# Patient Record
Sex: FEMALE | Race: OTHER | HISPANIC OR LATINO | Employment: OTHER | URBAN - METROPOLITAN AREA
[De-identification: names, ages, dates, MRNs, and addresses within clinical notes are randomized per-mention and may not be internally consistent; named-entity substitution may affect disease eponyms.]

---

## 2017-01-09 ENCOUNTER — GENERIC CONVERSION - ENCOUNTER (OUTPATIENT)
Dept: OTHER | Facility: OTHER | Age: 27
End: 2017-01-09

## 2017-02-28 ENCOUNTER — ALLSCRIPTS OFFICE VISIT (OUTPATIENT)
Dept: OTHER | Facility: OTHER | Age: 27
End: 2017-02-28

## 2017-02-28 PROCEDURE — 88175 CYTOPATH C/V AUTO FLUID REDO: CPT | Performed by: OBSTETRICS & GYNECOLOGY

## 2017-03-01 ENCOUNTER — LAB REQUISITION (OUTPATIENT)
Dept: LAB | Facility: HOSPITAL | Age: 27
End: 2017-03-01
Payer: COMMERCIAL

## 2017-03-01 DIAGNOSIS — R87.610 ATYPICAL SQUAMOUS CELLS OF UNDETERMINED SIGNIFICANCE ON CYTOLOGIC SMEAR OF CERVIX (ASC-US): ICD-10-CM

## 2017-03-07 LAB
LAB AP GYN PRIMARY INTERPRETATION: NORMAL
Lab: NORMAL
PATH INTERP SPEC-IMP: NORMAL

## 2017-03-15 ENCOUNTER — GENERIC CONVERSION - ENCOUNTER (OUTPATIENT)
Dept: OTHER | Facility: OTHER | Age: 27
End: 2017-03-15

## 2017-03-15 PROCEDURE — 88305 TISSUE EXAM BY PATHOLOGIST: CPT | Performed by: OBSTETRICS & GYNECOLOGY

## 2017-03-16 ENCOUNTER — LAB REQUISITION (OUTPATIENT)
Dept: LAB | Facility: HOSPITAL | Age: 27
End: 2017-03-16
Payer: COMMERCIAL

## 2017-03-16 DIAGNOSIS — R87.610 ATYPICAL SQUAMOUS CELLS OF UNDETERMINED SIGNIFICANCE ON CYTOLOGIC SMEAR OF CERVIX (ASC-US): ICD-10-CM

## 2017-03-29 ENCOUNTER — ALLSCRIPTS OFFICE VISIT (OUTPATIENT)
Dept: OTHER | Facility: OTHER | Age: 27
End: 2017-03-29

## 2017-03-31 ENCOUNTER — ALLSCRIPTS OFFICE VISIT (OUTPATIENT)
Dept: OTHER | Facility: OTHER | Age: 27
End: 2017-03-31

## 2017-04-03 ENCOUNTER — GENERIC CONVERSION - ENCOUNTER (OUTPATIENT)
Dept: OTHER | Facility: OTHER | Age: 27
End: 2017-04-03

## 2017-04-18 ENCOUNTER — ALLSCRIPTS OFFICE VISIT (OUTPATIENT)
Dept: OTHER | Facility: OTHER | Age: 27
End: 2017-04-18

## 2017-06-29 ENCOUNTER — ALLSCRIPTS OFFICE VISIT (OUTPATIENT)
Dept: OTHER | Facility: OTHER | Age: 27
End: 2017-06-29

## 2017-06-29 ENCOUNTER — LAB REQUISITION (OUTPATIENT)
Dept: LAB | Facility: HOSPITAL | Age: 27
End: 2017-06-29
Payer: COMMERCIAL

## 2017-06-29 DIAGNOSIS — R87.610 ATYPICAL SQUAMOUS CELLS OF UNDETERMINED SIGNIFICANCE ON CYTOLOGIC SMEAR OF CERVIX (ASC-US): ICD-10-CM

## 2017-06-29 PROCEDURE — 87624 HPV HI-RISK TYP POOLED RSLT: CPT | Performed by: OBSTETRICS & GYNECOLOGY

## 2017-06-29 PROCEDURE — 88175 CYTOPATH C/V AUTO FLUID REDO: CPT | Performed by: OBSTETRICS & GYNECOLOGY

## 2017-07-05 LAB — HPV RRNA GENITAL QL NAA+PROBE: NORMAL

## 2017-07-09 LAB
LAB AP GYN PRIMARY INTERPRETATION: NORMAL
Lab: NORMAL

## 2017-09-19 ENCOUNTER — GENERIC CONVERSION - ENCOUNTER (OUTPATIENT)
Dept: OTHER | Facility: OTHER | Age: 27
End: 2017-09-19

## 2017-11-30 ENCOUNTER — TRANSCRIBE ORDERS (OUTPATIENT)
Dept: URGENT CARE | Facility: MEDICAL CENTER | Age: 27
End: 2017-11-30

## 2017-11-30 ENCOUNTER — APPOINTMENT (OUTPATIENT)
Dept: LAB | Facility: MEDICAL CENTER | Age: 27
End: 2017-11-30
Attending: FAMILY MEDICINE

## 2017-11-30 ENCOUNTER — APPOINTMENT (OUTPATIENT)
Dept: URGENT CARE | Facility: MEDICAL CENTER | Age: 27
End: 2017-11-30

## 2017-11-30 DIAGNOSIS — Z02.1 PHYSICAL EXAM, PRE-EMPLOYMENT: Primary | ICD-10-CM

## 2017-11-30 DIAGNOSIS — Z02.1 PHYSICAL EXAM, PRE-EMPLOYMENT: ICD-10-CM

## 2017-11-30 PROCEDURE — 86735 MUMPS ANTIBODY: CPT

## 2017-11-30 PROCEDURE — 86787 VARICELLA-ZOSTER ANTIBODY: CPT

## 2017-11-30 PROCEDURE — 86480 TB TEST CELL IMMUN MEASURE: CPT

## 2017-11-30 PROCEDURE — 86765 RUBEOLA ANTIBODY: CPT

## 2017-11-30 PROCEDURE — 36415 COLL VENOUS BLD VENIPUNCTURE: CPT

## 2017-11-30 PROCEDURE — 86762 RUBELLA ANTIBODY: CPT

## 2017-12-01 LAB — RUBV IGG SERPL IA-ACNC: 60.3 IU/ML

## 2017-12-02 LAB
ANNOTATION COMMENT IMP: NORMAL
GAMMA INTERFERON BACKGROUND BLD IA-ACNC: 0.04 IU/ML
M TB IFN-G BLD-IMP: NEGATIVE
M TB IFN-G CD4+ BCKGRND COR BLD-ACNC: 0 IU/ML
M TB IFN-G CD4+ T-CELLS BLD-ACNC: 0.04 IU/ML
MITOGEN IGNF BLD-ACNC: 7.44 IU/ML
QUANTIFERON-TB GOLD IN TUBE: NORMAL
SERVICE CMNT-IMP: NORMAL

## 2017-12-05 LAB
MEV IGG SER QL: NORMAL
MUV IGG SER QL: NORMAL
VZV IGG SER IA-ACNC: NORMAL

## 2017-12-28 DIAGNOSIS — N91.2 AMENORRHEA: ICD-10-CM

## 2018-01-04 ENCOUNTER — APPOINTMENT (OUTPATIENT)
Dept: LAB | Facility: HOSPITAL | Age: 28
End: 2018-01-04
Attending: OBSTETRICS & GYNECOLOGY
Payer: COMMERCIAL

## 2018-01-04 DIAGNOSIS — N91.2 AMENORRHEA: ICD-10-CM

## 2018-01-04 LAB — B-HCG SERPL-ACNC: <2 MIU/ML

## 2018-01-04 PROCEDURE — 84702 CHORIONIC GONADOTROPIN TEST: CPT

## 2018-01-04 PROCEDURE — 36415 COLL VENOUS BLD VENIPUNCTURE: CPT

## 2018-01-10 NOTE — MISCELLANEOUS
Message  I informed pt of vaccine excursion for TDAP that was administered during her pregnancy  I offered pt revaccination due to possible lowered conferred immunity, pt will call the office to make an appointment for revaccination         Signatures   Electronically signed by : Abbott Cabot, M D ; Dec 29 2016 10:00AM EST                       (Author)

## 2018-01-11 NOTE — PROGRESS NOTES
AUG 22 2016         RE: Jaxon Bills                                   To: Aman Arroyo GYN   MR#: [id number]                                    [address]   :  [address]: [phone]:[address]   (Exam #: [id number])                           Fax: [phone]      The LMP of this 30year old,  G2, P1-0-0-1 patient was DEC 23 2015, giving   her an TYSON of SEP 24 2016 and a current gestational age of 27 weeks 2 days   by dates  A sonographic examination was performed on AUG 22 2016 using   real time equipment  The ultrasound examination was performed using   abdominal technique  The patient has a BMI of 34 6  Her blood pressure   today was 110/70  Earliest ultrasound found in her record: 16 9w1d 16 TYSON      Cardiac motion was observed at 150 bpm       INDICATIONS      third trimester bleeding      Exam Types      Amniotic Fluid Index      RESULTS      Fetus # 1 of 1   Vertex presentation   Placenta Location = Anterior   Placenta Grade = II      AMNIOTIC FLUID      Q1: 5 1      Q2: 4 6      Q3: 5 0      Q4: 2 8   AUSTIN Total = 17 5 cm   Amniotic Fluid: Normal      IMPRESSION      Roman IUP   Vertex presentation   Regular fetal heart rate of 150 bpm   Anterior placenta      GENERAL COMMENT      Jaxon Bills has had persistent third trimester bleeding  She presents   today for an ultrasound and nonstress tests  The nonstress test showed   good variability, the presence of accelerations and no decelerations  The   amniotic fluid was 17 cm which is reassuring  The fetus is vertex in presentation with an anterior placenta and thus   today's ultrasound was reassuring  She will continue weekly nonstress   testing and fluid assessments  TONIO Moreno M D     Maternal-Fetal Medicine   Electronically signed 16 14:46

## 2018-01-11 NOTE — PROGRESS NOTES
Active Problems    1  Abnormal placenta affecting management of mother in second trimester (656 73)   (O43 92)   2  Anxiety (300 00) (F41 9)   3  Chronic migraine without aura with status migrainosus, not intractable (346 72)   (G43 701)   4  Diet controlled gestational diabetes mellitus (GDM) in second trimester (648 83)   (O24 410)   5  Hyperthyroidism (242 90) (E05 90)   6  Hypothyroidism (244 9) (E03 9)   7  Hypothyroidism complicating pregnancy, second trimester (648 13,244 9)   (O99 282,E03 9)   8  Insulin controlled gestational diabetes mellitus (GDM) in second trimester (648 83)   (O24 414)   9  Maternal obesity, antepartum, second trimester (649 13,278 00) (O99 212,E66 9)   10  Normal pregnancy, unspecified trimester (V22 1) (Z34 90)   11  Uncomplicated asthma, unspecified asthma severity (493 90) (J45 909)   12  Yeast infection of the vagina (112 1) (B37 3)    Current Meds    1  Accu-Chek FastClix Lancets Miscellaneous; 4 times a day as directed; Therapy: 45Pjg3946 to (Evaluate:02Boq4615)  Requested for: 40Bzk6991; Last   Rx:25Apr2016 Ordered   2  Accu-Chek SmartView In Vitro Strip; TEST 4 TIMES DAILY; Therapy: 59Fhu1166 to (Peter Semen)  Requested for: 13Prz3732; Last   Rx:90Rvl4378 Ordered    3  BD Pen Needle Short U/F 31G X 8 MM Miscellaneous; USE AS DIRECTED  1 per   injection  Takes 6 injections daily; Therapy: 36YFS2701 to (Evaluate:29Aft4752)  Requested for: 95WCW7315; Last   Rx:09May2016 Ordered   4  Lantus SoloStar 100 UNIT/ML Subcutaneous Solution Pen-injector; Inject 20 units   Lantus at bedtime, titrate as directed; Therapy: 04BFJ4183 to (Evaluate:75Zun8068)  Requested for: 05OAA4400; Last   Rx:09May2016 Ordered    5  Prenatal 1+1 TABS; Therapy: (Recorded:20Apr2016) to Recorded   6  Synthroid 75 MCG Oral Tablet (Levothyroxine Sodium); Therapy: (Recorded:95Fyz6215) to Recorded    Allergies    1  No Known Drug Allergies    2   Seasonal    Results/Data  15135 OB Ultrasound, After First Trimester, > or = 14 week: Indication: EDC gestational age 22 weeks  Findings:     52447 Abdominal Ultrasound OB Katrin [place]:   Procedure: 74028- Ultrasound pregnant uterus real time with image documentation, limited one or more fetuses  Indication: EDC gestational age 22 weeks  Exam indication: bleeding  Findings:   Fetal heart beat: 140  Placental location: anterior  Fetal position: breech  42913 Transvaginal Ultrasound OB [facility]:   Procedure: 55395-NTWGFSGUQO pregnant uterus real time with image documentation, fetal and maternal evaluation, first trimester (<14 weeks 0 days), transvaginal approach: single or first gestation  Indication: EDC gestational age 22 weeks  Exam indication: bleeding  Findings:   Impression: CL 42-45 mm no funneling, no previa        Future Appointments    Date/Time Provider Specialty Site   2016 01:30 PM  [place], Schedule  [facility]   2016 03:00 PM Jessica Martin DO Obstetrics/Gynecology  [facility]     Signatures   Electronically signed by : MINERVA Kay ; May 17 2016 10:57AM EST                       (Author)

## 2018-01-11 NOTE — PROGRESS NOTES
AUG 16 2016         RE: Jaxon Pac                                   To: Aman Cruz GYN   MR#: [id number]                                    [address]   : SEP Rupinder Mariela:                                              [address] [place]   (Exam #: [id number])                           Fax: [phone]      The LMP of this 30year old,  G2, P1-0-0-1 patient was DEC 23 2015, giving   her an TYSON of SEP 24 2016 and a current gestational age of 29 weeks 3 days   by dates  A sonographic examination was performed on AUG 16 2016 using   real time equipment  The ultrasound examination was performed using   abdominal technique  Earliest ultrasound found in her record: 16 9w1d 16 TYSON      Dave Mendoza is currently receiving evaluation on Labor and delivery at [facility] for   the indication of third trimester vaginal bleeding  Cardiac motion was observed at 141 bpm       INDICATIONS      third trimester bleeding      Exam Types      Level I      RESULTS      Fetus # 1 of 1   Vertex presentation   Fetal growth appeared normal   Placenta Location = Anterior   No placenta previa   Placenta Grade = II      MEASUREMENTS (* Included In Average GA)      AC              29 6 cm        33 weeks 5 days* (37%)   BPD              8 7 cm        35 weeks 1 day * (59%)   HC              33 0 cm        37 weeks 0 days* (77%)   Femur            6 5 cm        33 weeks 2 days* (36%)      Cerebellum       4 6 cm        35 weeks 6 days      HC/AC           1 12   FL/AC           0 22   FL/BPD          0 75   EFW (Ac/Fl/Hc)  2373 grams - 5 lbs 4 oz                 (40%)      THE AVERAGE GESTATIONAL AGE is 34 weeks 6 days +/- 21 days        AMNIOTIC FLUID      AUSTIN Total = 21 2 cm   Amniotic Fluid: Normal      ANATOMY DETAILS      Visualized Appearing Sonographically Normal:   HEAD: (Calvarium, BPD Level, Cavum, Lateral Ventricles, Choroid Plexus,   Cerebellum);    TH  CAV : (Diaphragm); HEART: (FD9 Group,   Cardiac Rayne);    ABD  CAV , STOMACH, RIGHT KIDNEY, LEFT KIDNEY, BLADDER,   PLACENTA      IMPRESSION      Roman IUP   34 weeks and 6 days by this ultrasound  (TYSON=SEP 21 2016)   Vertex presentation   Fetal growth appeared normal   Normal anatomy survey   Regular fetal heart rate of 141 bpm   Anterior placenta   No placenta previa      GENERAL COMMENT      No fetal structural abnormality is identified on the Level I survey today  Fetal interval growth and amniotic fluid volume are normal   The placenta   is normal in appearance  There is no sonographic suspicion of placental   abruption  Olean Kanner, M D     Maternal-Fetal Medicine   Electronically signed 08/16/16 13:48

## 2018-01-12 VITALS
BODY MASS INDEX: 32.96 KG/M2 | SYSTOLIC BLOOD PRESSURE: 116 MMHG | WEIGHT: 179.13 LBS | HEIGHT: 62 IN | DIASTOLIC BLOOD PRESSURE: 76 MMHG

## 2018-01-13 VITALS
DIASTOLIC BLOOD PRESSURE: 75 MMHG | WEIGHT: 175 LBS | HEIGHT: 62 IN | SYSTOLIC BLOOD PRESSURE: 117 MMHG | BODY MASS INDEX: 32.2 KG/M2

## 2018-01-13 VITALS
DIASTOLIC BLOOD PRESSURE: 70 MMHG | HEIGHT: 62 IN | SYSTOLIC BLOOD PRESSURE: 118 MMHG | WEIGHT: 179 LBS | BODY MASS INDEX: 32.94 KG/M2

## 2018-01-13 VITALS
SYSTOLIC BLOOD PRESSURE: 117 MMHG | HEIGHT: 62 IN | WEIGHT: 176 LBS | BODY MASS INDEX: 32.39 KG/M2 | DIASTOLIC BLOOD PRESSURE: 75 MMHG

## 2018-01-13 NOTE — MISCELLANEOUS
Message   DM Non-compliance [facility]:     Date: 2016     TYSON: 2016     Dear Dominic Selby:     It has come to our attention that you have not followed through with your recommended diabetes plan of care  As a patient that is currently receiving treatment for diabetes during pregnancy, you have been counseled regarding the following: the importance of regular blood glucose (sugar) monitoring; reporting the blood glucose levels to our office; nutrition and medication adjustments as directed by our office  You are receiving this letter because our records indicate that you are currently non-compliant with your treatment for the following reason(s): Other: Education received on May 9; last follow up on   Blood glucose (sugar) levels that are high during pregnancy can result in problems for both mother and unborn child  These include, but are not limited to;     - Fetal macrosomia (large baby)  This can lead to shoulder dystocia (dislocated shoulder in the baby during delivery), need for a , vaginal and rectal tearing for the mother      -  hypoglycemia (low blood sugar in the baby after delivery)  This can lead to admission to the intensive care unit, and the need for intravenous glucose (sugar) given to the baby  - Fetal demise (death) or stillbirth  -  respiratory distress ( being unable to breathe on it's own)  Can lead to intensive care unit admission      - Pre term labor  We have been unable to resolve these concerns with you directly  Due to the complexity of this treatment plan, we kindly request that you contact us to resolve these outstanding issues  Please be advised that continued non-compliance may result in consequences, including but not limited to, our inability to continue to prescribe this treatment plan for you, and possible discharge from our care       Upon receipt of this notice, please contact us at [phone] to arrange a necessary follow-up  Thank you for allowing us to continue to care for you  Sincerely,     [facility] Diabetes and Pregnancy Team            Patient Care Team    Care Team Member Role Specialty Office Number   Dominic Castro MD  Obstetrics/Gynecology [phone]   [place]  [phone]   Phil [facility] , MD, error  - [phone]   Emma [facility]  - [phone]   Marline [facility]  - [phone]   [facility] [phone]   Syeda [facility]  - [phone]     Signatures   Electronically signed by : Jessie Cristobal;  Aug 24 2016  2:19PM EST                       (Author)

## 2018-01-15 NOTE — MISCELLANEOUS
Message   DM Non-compliance [facility]:     Date: 2016     TYSON: 2016     Dear Sinai Anderson:     It has come to our attention that you have not followed through with your recommended diabetes plan of care  As a patient that is currently receiving treatment for diabetes during pregnancy, you have been counseled regarding the following: the importance of regular blood glucose (sugar) monitoring; reporting the blood glucose levels to our office; nutrition and medication adjustments as directed by our office  You are receiving this letter because our records indicate that you are currently non-compliant with your treatment for the following reason(s): Other: Education received on May 9; no follow up blood sugars given to office  Blood glucose (sugar) levels that are high during pregnancy can result in problems for both mother and unborn child  These include, but are not limited to;     - Fetal macrosomia (large baby)  This can lead to shoulder dystocia (dislocated shoulder in the baby during delivery), need for a , vaginal and rectal tearing for the mother      -  hypoglycemia (low blood sugar in the baby after delivery)  This can lead to admission to the intensive care unit, and the need for intravenous glucose (sugar) given to the baby  - Fetal demise (death) or stillbirth  - [place] respiratory distress ( being unable to breathe on it's own)  Can lead to intensive care unit admission      - Pre term labor  We have been unable to resolve these concerns with you directly  Due to the complexity of this treatment plan, we kindly request that you contact us to resolve these outstanding issues  Please be advised that continued non-compliance may result in consequences, including but not limited to, our inability to continue to prescribe this treatment plan for you, and possible discharge from our care       Upon receipt of this notice, please contact us at [phone] to arrange a necessary follow-up  Thank you for allowing us to continue to care for you  Sincerely,     [facility] Diabetes and Pregnancy Team            Patient Care Team    Care Team Member Role Specialty Office Number   Anahy Venegas MD  Obstetrics/Gynecology [phone]   [place]  [phone]   Lavern [facility] MD, error  - [phone]   Lennox Crandall M D  - [phone]   Susu [facility]  - [phone]   [facility] [phone]   Cecilia [facility]    - [phone]     Signatures   Electronically signed by : Jose Severin; 2016  2:08PM EST                       (Author)

## 2018-01-16 ENCOUNTER — ALLSCRIPTS OFFICE VISIT (OUTPATIENT)
Dept: OTHER | Facility: OTHER | Age: 28
End: 2018-01-16

## 2018-01-16 LAB — HCG, QUALITATIVE (HISTORICAL): NEGATIVE

## 2018-01-16 NOTE — PROGRESS NOTES
2016         RE: Rui Berg GYN   MR#: [id number]                                    [address]   :  [address]: [phone]:[facility]                             [address] [place] [address]   (Exam #: [id number])                           Fax: [phone]      The LMP of this 30year old,  G2, P1-*-*-1 patient was DEC 23 2015, giving   her an TYSON of SEP 24 2016 and a current gestational age of 33 weeks 4 days   by dates  A sonographic examination was performed on 2016 using   real time equipment  The ultrasound examination was performed using   abdominal technique  The patient has a BMI of 33 8  Her blood pressure   today was 105/50  Earliest ultrasound found in her record: 16 9w1d 16 TYSON      Cardiac motion was observed at 136 bpm       INDICATIONS      hypothyroidism   diabetes, gestation-previous pregnancy   obesity   fetal growth   abnormal uterine Doppler      Exam Types      Level I      RESULTS      Fetus # 1 of 1   Breech presentation   Fetal growth appeared normal   Placenta Location = Anterior   Placenta Grade = II      MEASUREMENTS (* Included In Average GA)      AC              24 8 cm        29 weeks 0 days* (36%)   BPD              7 5 cm        30 weeks 1 day * (54%)   HC              27 3 cm        29 weeks 4 days* (34%)   Femur            5 6 cm        29 weeks 2 days* (39%)      HC/AC           1 10   FL/AC           0 22   FL/BPD          0 74   EFW (Ac/Fl/Hc)  1365 grams - 3 lbs 0 oz                 (42%)      THE AVERAGE GESTATIONAL AGE is 29 weeks 4 days +/- 18 days  AMNIOTIC FLUID      Q1: 6 1      Q2: 3 6      Q3: 3 5      Q4: 4 4   AUSTIN Total = 17 5 cm   Amniotic Fluid: Normal      ANATOMY DETAILS      Visualized Appearing Sonographically Normal:   HEAD: (Calvarium, BPD Level, Cavum, Lateral Ventricles, Choroid Plexus);       HEART: (Cardiac Axis, Cardiac Position);    STOMACH, RIGHT KIDNEY, LEFT   KIDNEY, BLADDER, PLACENTA      Not Visualized:   HEAD: (Cerebellum, Cisterna Magna); HEART: (Four Chamber View, Proximal   Left Outflow, Proximal Right Outflow, 3 Vessel Trachea, Interventricular   Septum, Interatrial Septum)      IMPRESSION      Roman IUP   29 weeks and 4 days by this ultrasound  (TYSON=SEP 24 2016)   Breech presentation   Fetal growth appeared normal   Regular fetal heart rate of 136 bpm   Anterior placenta      GENERAL COMMENT      I had the pleasure of seeing Mathieu Brewer  in the [facility] of [facility]  today   for followup growth scan  She reports normal daily fetal movements  She   denies any vaginal bleeding, leakage of fluid, or significant contractions   or pelvic pressure  There has been no major pregnancy complications since   her last visit here in the  Center  She is hypothyroid  She is   also obese with a BMI of 33  She did have abnormal uterine artery Doppler   flow studies on her prior ultrasound  On today's ultrasound, the fetus was in a breech presentation  The   amniotic fluid appeared very normal today  Fetal growth was within normal   range  The overall size is at the 42nd percentile and the abdominal   circumference is at the 36th percentile and thus both of these   measurements are very reassuring  The interval anatomy seen today showed   no obvious anomalies  We discussed the importance of initiating fetal kick counting at least   once daily  We discussed the "10 kicks in 2 hour rule"  I instructed her   to report to you immediately should criteria not be met  Kick counts   should begin at 28 weeks gestation  The fetus is in a breech presentation today  The fetus was still very   active on the ultrasound  The amniotic fluid was normal today  I did   recommend a follow-up growth scan in 6 weeks   Thank you kindly for this   referral Face-to-face time, in addition to time spent discussing   ultrasound results was 10 minutes, with greater than 50% of the time used   for counseling and coordination of care  TONIO Wall M D     Maternal-Fetal Medicine   Electronically signed 07/13/16 16:34

## 2018-01-16 NOTE — MISCELLANEOUS
Message  pt states was at the ER on the weekend, pt call today states having blood clots and not feeling well, pt is coming in today  Active Problems    1  Abnormal placenta affecting management of mother in second trimester (656 73)   (O43 92)   2  Anxiety (300 00) (F41 9)   3  ASCUS of cervix with negative high risk HPV (795 01) (R87 610)   4  Chronic migraine without aura with status migrainosus, not intractable (346 72)   (G43 701)   5  Diet controlled gestational diabetes mellitus (GDM) in second trimester (648 83)   (O24 410)   6  GBS bacteriuria (599 0,041 02) (N39 0,B95 1)   7  Hyperthyroidism (242 90) (E05 90)   8  Hypothyroidism (244 9) (E03 9)   9  Hypothyroidism complicating pregnancy, second trimester (648 13,244 9)   (O99 282,E03 9)   10  Insulin controlled gestational diabetes mellitus (GDM) in second trimester (648 83)    (O24 414)   11  Low back pain during pregnancy in third trimester (646 80,724 2) (O26 93,M54 5)   12  Maternal obesity, antepartum, second trimester (649 13,278 00) (O99 212,E66 9)   13  Normal pregnancy, unspecified trimester (V22 1) (Z34 90)   14  Pelvic pain in pregnancy (646 80,625 9) (O26 899,R10 2)   15  Third trimester pregnancy (V22 2) (Z33 1)   16  Uncomplicated asthma, unspecified asthma severity (493 90) (J45 909)   17  Yeast infection of the vagina (112 1) (B37 3)    Current Meds   1  Accu-Chek FastClix Lancets Miscellaneous; 4 times a day as directed; Therapy: 10Cem5388 to (Evaluate:28Vvv6679)  Requested for: 15Hud2754; Last   Rx:49Uri4659 Ordered   2  Accu-Chek SmartView In Vitro Strip; TEST 4 TIMES DAILY; Therapy: 85Odu7013 to (Gavino Chew)  Requested for: 29Bfh4908; Last   Rx:80Eut0452 Ordered   3  Acetaminophen-Codeine #3 300-30 MG Oral Tablet; TAKE 1 TABLET 3 TIMES DAILY AS   NEEDED FOR PAIN;   Therapy: 19Klz9330 to (Evaluate:90Yvb2947); Last Rx:02Aug2016 Ordered   4   Apidra SoloStar 100 UNIT/ML Subcutaneous Solution Pen-injector; 4 units before dinner   qd, titrate weekly as needed; Therapy: 77TEL6536 to (Evaluate:10Qzc5988)  Requested for: 44HIJ9054; Last   Rx:62Pfe9723 Ordered   5  BD Pen Needle Short U/F 31G X 8 MM Miscellaneous; USE AS DIRECTED  1 per   injection  Takes 6 injections daily; Therapy: 54SUZ2786 to (Evaluate:07Mmt2813)  Requested for: 39KWC2039; Last   Rx:99Uhu4928 Ordered   6  Cyclobenzaprine HCl - 10 MG Oral Tablet; TAKE 1 TABLET 3 TIMES DAILY AS NEEDED; Therapy: 21Mdt6877 to (Evaluate:26Lgq2399)  Requested for: 31Drb9229; Last   Rx:09Pdp6828 Ordered   7  Lantus SoloStar 100 UNIT/ML Subcutaneous Solution Pen-injector; Inject 20 units   Lantus at bedtime, titrate as directed; Therapy: 82VBJ4162 to (Evaluate:78Hso2750)  Requested for: 71VKL8928; Last   Rx:09May2016 Ordered   8  Prenatal 1+1 TABS; Therapy: (Recorded:28Xor8394) to Recorded   9  Synthroid 75 MCG Oral Tablet; Therapy: (Recorded:08Nca0934) to Recorded   10  Tylenol with Codeine #3 300-30 MG Oral Tablet; TAKE 1 TO 2 TABLETS EVERY  6    HOURS AS NEEDED FOR PAIN;    Therapy: 53VZM8824 to (Evaluate:12Qpz5525); Last Rx:05Yiq7037 Ordered    Allergies    1  No Known Drug Allergies    2   Seasonal    Signatures   Electronically signed by : Juliana Tejeda DO; Aug 16 2016  9:10AM EST                       (Author)

## 2018-01-16 NOTE — PROGRESS NOTES
Assessment    1  Third trimester pregnancy (V22 2) (Z33 1)   2  Insulin controlled gestational diabetes mellitus (GDM) in second trimester (648 83)   (O24 414)    Plan  Insulin controlled gestational diabetes mellitus (GDM) in second trimester    · (1) HEMOGLOBIN A1C; Status:Active; Requested for:07Ony7366; Third trimester pregnancy    · (1) CBC/ PLT (NO DIFF); Status:Active; Requested for:52Nyq5377;    · (1) RPR; Status:Active; Requested for:25Npm5824; Active Problems    1  Abnormal placenta affecting management of mother in second trimester (656 73)   (O43 92)   2  Anxiety (300 00) (F41 9)   3  Chronic migraine without aura with status migrainosus, not intractable (346 72)   (G43 701)   4  Diet controlled gestational diabetes mellitus (GDM) in second trimester (648 83)   (O24 410)   5  GBS bacteriuria (599 0,041 02) (N39 0,B95 1)   6  Hyperthyroidism (242 90) (E05 90)   7  Hypothyroidism (244 9) (E03 9)   8  Hypothyroidism complicating pregnancy, second trimester (648 13,244 9)   (O99 282,E03 9)   9  Insulin controlled gestational diabetes mellitus (GDM) in second trimester (648 83)   (O24 414)   10  Maternal obesity, antepartum, second trimester (649 13,278 00) (O99 212,E66 9)   11  Normal pregnancy, unspecified trimester (V22 1) (Z34 90)   12  Third trimester pregnancy (V22 2) (Z33 1)   13  Uncomplicated asthma, unspecified asthma severity (493 90) (J45 909)   14  Yeast infection of the vagina (112 1) (B37 3)    Current Meds    1  Accu-Chek FastClix Lancets Miscellaneous; 4 times a day as directed; Therapy: 08Uay4915 to (Evaluate:99Suc9450)  Requested for: 10Odh6657; Last   Rx:61Ofg0911 Ordered   2  Accu-Chek SmartView In Vitro Strip; TEST 4 TIMES DAILY; Therapy: 95Ipr2994 to (Sb Alvarez)  Requested for: 65Fbm8435; Last   Rx:93Mbc2309 Ordered    3  BD Pen Needle Short U/F 31G X 8 MM Miscellaneous; USE AS DIRECTED  1 per   injection  Takes 6 injections daily;    Therapy: 98TFR2490 to (Evaluate:52Xbx0814)  Requested for: 99TCP1477; Last   Rx:64Ucf2463 Ordered   4  Lantus SoloStar 100 UNIT/ML Subcutaneous Solution Pen-injector; Inject 20 units   Lantus at bedtime, titrate as directed; Therapy: 00IFN5823 to (Evaluate:62Bbs1959)  Requested for: 87VEH8365; Last   Rx:38Jjy1232 Ordered    5  Prenatal 1+1 TABS; Therapy: (Recorded:2016) to Recorded   6  Synthroid 75 MCG Oral Tablet (Levothyroxine Sodium); Therapy: (Recorded:2016) to Recorded    Allergies    1  No Known Drug Allergies    2  Seasonal    Results/Data  Transvaginal Ultrasound:   Procedure: Transvaginal Pelvic Sonogram    Indication: r/o  labor, pelvic pain h/o LEEP  Procedure Note:   Patient placed in dorsal lithotomy position with legs in stirrups  Ultrasound probe was covered wtih a condom, lubricated with water soluable gel  The ultrasound study was then performed  Findings:   Impression:  CL 34-35 mm no funneling  Patient Status: the patient tolerated the procedure well      [id number] Abdominal Ultrasound [facility]:   Procedure: 79175- Ultrasound pregnant uterus real time with image documentation, limited one or more fetuses  Indication: EDC gestational age 35 weeks  Exam indication: unable to detect FHR on doppler, pelvic pain  Findings:   Amniotic fluid volume: LVP:4 92  Fetal heart beat: 133 bpm    Placental location: anterior fundal    Fetal position: vertex     Impression: Vertex presentation   bpm       Future Appointments    Date/Time Provider Specialty Site   2016 03:30 PM [address]   2016 01:45 PM Myrna Beasley DO Obstetrics/Gynecology [address] [facility]     Signatures   Electronically signed by : MINERVA Echevarria ; 2016  2:05PM EST                       (Author)

## 2018-01-17 NOTE — MISCELLANEOUS
Message  pt never showed up at the hospital, left message for pt to give office a call back  Active Problems   1  Abnormal placenta affecting management of mother in second trimester (656 73)   (O43 92)  2  Anxiety (300 00) (F41 9)  3  ASCUS of cervix with negative high risk HPV (795 01) (R87 610)  4  Chronic migraine without aura with status migrainosus, not intractable (346 72)   (G43 701)  5  Diet controlled gestational diabetes mellitus (GDM) in second trimester (648 83)   (O24 410)  6  GBS bacteriuria (599 0,041 02) (N39 0,B95 1)  7  Hyperthyroidism (242 90) (E05 90)  8  Hypothyroidism (244 9) (E03 9)  9  Hypothyroidism complicating pregnancy, second trimester (648 13,244 9)   (O99 282,E03 9)  10  Insulin controlled gestational diabetes mellitus (GDM) in second trimester (648 83)    (O24 414)  11  Low back pain during pregnancy in third trimester (646 80,724 2) (O26 93,M54 5)  12  Maternal obesity, antepartum, second trimester (649 13,278 00) (O99 212,E66 9)  13  Normal pregnancy, unspecified trimester (V22 1) (Z34 90)  14  Third trimester pregnancy (V22 2) (Z33 1)  15  Uncomplicated asthma, unspecified asthma severity (493 90) (J45 909)  16  Yeast infection of the vagina (112 1) (B37 3)    Current Meds  1  Accu-Chek FastClix Lancets Miscellaneous; 4 times a day as directed; Therapy: 04Rrt2671 to (Evaluate:98Lmd6276)  Requested for: 70Hng2740; Last   Rx:92Cql8374 Ordered  2  Accu-Chek SmartView In Vitro Strip; TEST 4 TIMES DAILY; Therapy: 37Kuq9207 to (Mignon Nazario)  Requested for: 45Zsd1763; Last   Rx:35Qag4063 Ordered  3  Apidra SoloStar 100 UNIT/ML Subcutaneous Solution Pen-injector; 4 units before dinner   qd, titrate weekly as needed; Therapy: 12VDK1389 to (Evaluate:65Vpr8303)  Requested for: 51LZX2198; Last   Rx:13Rpi9706 Ordered  4  BD Pen Needle Short U/F 31G X 8 MM Miscellaneous; USE AS DIRECTED  1 per   injection  Takes 6 injections daily;    Therapy: 16QKJ8092 to (Evaluate:06Sep2016)  Requested for: 93AMI2454; Last   Rx:09May2016 Ordered  5  Lantus SoloStar 100 UNIT/ML Subcutaneous Solution Pen-injector; Inject 20 units   Lantus at bedtime, titrate as directed; Therapy: 34WYR7385 to (Evaluate:06Sep2016)  Requested for: 50HLH0422; Last   Rx:09May2016 Ordered  6  Prenatal 1+1 TABS; Therapy: (Recorded:20Apr2016) to Recorded  7  Synthroid 75 MCG Oral Tablet; Therapy: (Recorded:20Apr2016) to Recorded  8  Tylenol with Codeine #3 300-30 MG Oral Tablet; TAKE 1 TO 2 TABLETS EVERY  6   HOURS AS NEEDED FOR PAIN;   Therapy: 40SYN5974 to (Evaluate:36Mjx2682); Last Rx:38Hjg1746 Ordered    Allergies   1  No Known Drug Allergies   2   Seasonal    Signatures   Electronically signed by : Juan José Odonnell, ; Jul 26 2016  2:20PM EST                       (Author)    Electronically signed by : MINERVA Garner ; Jul 28 2016  7:44AM EST                       (Author)

## 2018-01-22 VITALS
WEIGHT: 177 LBS | HEIGHT: 62 IN | DIASTOLIC BLOOD PRESSURE: 70 MMHG | SYSTOLIC BLOOD PRESSURE: 110 MMHG | BODY MASS INDEX: 32.57 KG/M2

## 2018-01-22 VITALS
HEIGHT: 62 IN | SYSTOLIC BLOOD PRESSURE: 118 MMHG | DIASTOLIC BLOOD PRESSURE: 75 MMHG | BODY MASS INDEX: 32.94 KG/M2 | WEIGHT: 179 LBS

## 2018-01-22 VITALS
BODY MASS INDEX: 32.57 KG/M2 | HEIGHT: 62 IN | WEIGHT: 177 LBS | SYSTOLIC BLOOD PRESSURE: 117 MMHG | DIASTOLIC BLOOD PRESSURE: 70 MMHG

## 2018-01-22 VITALS
BODY MASS INDEX: 32.94 KG/M2 | HEIGHT: 62 IN | SYSTOLIC BLOOD PRESSURE: 117 MMHG | WEIGHT: 179 LBS | DIASTOLIC BLOOD PRESSURE: 70 MMHG

## 2018-01-22 VITALS
HEIGHT: 62 IN | WEIGHT: 177 LBS | SYSTOLIC BLOOD PRESSURE: 119 MMHG | BODY MASS INDEX: 32.57 KG/M2 | DIASTOLIC BLOOD PRESSURE: 75 MMHG

## 2018-04-16 ENCOUNTER — TELEPHONE (OUTPATIENT)
Dept: OBGYN CLINIC | Facility: CLINIC | Age: 28
End: 2018-04-16

## 2018-04-16 ENCOUNTER — CLINICAL SUPPORT (OUTPATIENT)
Dept: OBGYN CLINIC | Facility: CLINIC | Age: 28
End: 2018-04-16
Payer: COMMERCIAL

## 2018-04-16 VITALS
WEIGHT: 170 LBS | DIASTOLIC BLOOD PRESSURE: 75 MMHG | SYSTOLIC BLOOD PRESSURE: 112 MMHG | BODY MASS INDEX: 32.1 KG/M2 | HEIGHT: 61 IN

## 2018-04-16 DIAGNOSIS — Z30.013 ENCOUNTER FOR INITIAL PRESCRIPTION OF INJECTABLE CONTRACEPTIVE: Primary | ICD-10-CM

## 2018-04-16 PROCEDURE — 96372 THER/PROPH/DIAG INJ SC/IM: CPT | Performed by: OBSTETRICS & GYNECOLOGY

## 2018-04-16 RX ORDER — MEDROXYPROGESTERONE ACETATE 150 MG/ML
150 INJECTION, SUSPENSION INTRAMUSCULAR ONCE
Status: COMPLETED | OUTPATIENT
Start: 2018-04-16 | End: 2018-04-16

## 2018-04-16 RX ORDER — MEDROXYPROGESTERONE ACETATE 150 MG/ML
INJECTION, SUSPENSION INTRAMUSCULAR
COMMUNITY
Start: 2017-06-27 | End: 2021-02-24

## 2018-04-16 RX ORDER — ESTRADIOL 0.1 MG/G
CREAM VAGINAL
COMMUNITY
Start: 2017-02-28 | End: 2019-09-04 | Stop reason: ALTCHOICE

## 2018-04-16 RX ORDER — LEVOTHYROXINE SODIUM 0.07 MG/1
TABLET ORAL
COMMUNITY
End: 2020-08-03 | Stop reason: DRUGHIGH

## 2018-04-16 RX ADMIN — MEDROXYPROGESTERONE ACETATE 150 MG: 150 INJECTION, SUSPENSION INTRAMUSCULAR at 11:46

## 2018-04-16 NOTE — TELEPHONE ENCOUNTER
Order has been signed  Please notify patient she is past due for annual exam with repeat Pap smear    Had abnormal Pap with cryocautery last spring

## 2018-07-27 ENCOUNTER — OFFICE VISIT (OUTPATIENT)
Dept: OBGYN CLINIC | Facility: CLINIC | Age: 28
End: 2018-07-27
Payer: COMMERCIAL

## 2018-07-27 VITALS
BODY MASS INDEX: 31.91 KG/M2 | WEIGHT: 169 LBS | HEIGHT: 61 IN | SYSTOLIC BLOOD PRESSURE: 110 MMHG | DIASTOLIC BLOOD PRESSURE: 68 MMHG

## 2018-07-27 DIAGNOSIS — Z30.09 COUNSELING FOR INITIATION OF BIRTH CONTROL METHOD: ICD-10-CM

## 2018-07-27 DIAGNOSIS — Z30.017 ENCOUNTER FOR INITIAL PRESCRIPTION OF NEXPLANON: Primary | ICD-10-CM

## 2018-07-27 PROCEDURE — 99213 OFFICE O/P EST LOW 20 MIN: CPT | Performed by: OBSTETRICS & GYNECOLOGY

## 2018-07-27 NOTE — PATIENT INSTRUCTIONS
Etonogestrel (Implant)   Etonogestrel (e-toe-brooks-HAY-trel)  Prevents pregnancy  Brand Name(s): Nexplanon   There may be other brand names for this medicine  When This Medicine Should Not Be Used: This medicine is not right for everyone  You should not receive it if you had an allergic reaction to etonogestrel, or if you are pregnant  Do not use it if you have breast cancer, heart disease, liver disease, or a history of blood clots (such as deep vein thrombosis, pulmonary embolism, or stroke)  How to Use This Medicine:   Implant  · A nurse or other trained health professional will give you this medicine  · This medicine is an implant  It will be surgically placed under the skin of your upper, inner arm  · Gently press your fingertips over the skin where this medicine was inserted  You should be able to feel the implant  · You might have to use another form of birth control for 7 days after the implant is inserted  Your doctor will tell you if this is needed  · Your doctor can remove the implant at any time if you want to stop using this medicine  The implant must be removed after 3 years, but you may have a new one inserted if you still want to use this form of birth control  · Read and follow the patient instructions that come with this medicine  Talk to your doctor or pharmacist if you have any questions  Drugs and Foods to Avoid:   Ask your doctor or pharmacist before using any other medicine, including over-the-counter medicines, vitamins, and herbal products  · Some foods and medicines can affect how etonogestrel works  Tell your doctor if you are using any of the following:  ¨ Bosentan, carbamazepine, cyclosporine, felbamate, griseofulvin, itraconazole, ketoconazole, lamotrigine, oxcarbazepine, phenobarbital, phenytoin, rifampin, Suzanne wort, topiramate  ¨ Medicine for HIV/AIDS  Warnings While Using This Medicine:   · Tell your doctor right away if you think you become pregnant   The implant will need to be removed  · Tell your doctor if you have cancer, blood circulation problems, high blood pressure, or kidney disease, or if you smoke  Tell your doctor if you are breastfeeding, or if you have recently given birth  Also tell your doctor if you have diabetes or prediabetes, high cholesterol, or a history of depression  · This medicine may cause the following problems:  ¨ Ectopic (tubal) pregnancy  ¨ Cysts in the ovaries  ¨ Possible risk of breast cancer  ¨ Higher risk of heart attack, stroke, or blood clots  ¨ Liver cancers or tumors  ¨ High blood pressure  · This medicine may change your usual menstrual cycle  You might have irregular bleeding, or your periods may be lighter, shorter, heavier, or longer  You might not have a period in some cycles  However, call your doctor if you think you are pregnant or if you have severe pain or changes that worry you  · This medicine will not protect you from HIV/AIDS or other sexually transmitted diseases  · You might need to have the implant removed if you will be inactive for a period of time, such as after major surgery, because of the risk of blood clots  · Tell any doctor or dentist who treats you that you are using this medicine  This medicine may affect certain medical test results  · Your doctor will check your progress and the effects of this medicine at regular visits  Keep all appointments  · Keep all medicine out of the reach of children  Never share your medicine with anyone    Possible Side Effects While Using This Medicine:   Call your doctor right away if you notice any of these side effects:  · Allergic reaction: Itching or hives, swelling in your face or hands, swelling or tingling in your mouth or throat, chest tightness, trouble breathing  · Chest pain, trouble breathing, or coughing up blood  · Dark urine or pale stools, nausea, vomiting, loss of appetite, stomach pain, yellow skin or eyes  · Double vision or other trouble seeing  · Numbness or weakness on one side of your body, sudden or severe headache, problems with vision, speech, or walking  · Pain in your lower leg (calf)  · Severe or ongoing pain, tingling, bleeding, bruising, redness, itching, or swelling where the implant is placed  · Unusual or severe pain in your abdomen  · Unusual or unexpected vaginal bleeding or heavy bleeding  If you notice these less serious side effects, talk with your doctor:   · Acne or pimples  · Mild headache  · Mild pain, tingling, bleeding, bruising, redness, itching, or swelling where the implant is placed  · Mood changes  · Weight gain  If you notice other side effects that you think are caused by this medicine, tell your doctor  Call your doctor for medical advice about side effects  You may report side effects to FDA at 3-442-FDA-0236  © 2017 [address] [employer] Information is for End User's use only and may not be sold, redistributed or otherwise used for commercial purposes  The above information is an  only  It is not intended as medical advice for individual conditions or treatments  Talk to your doctor, nurse or pharmacist before following any medical regimen to see if it is safe and effective for you

## 2018-07-27 NOTE — PROGRESS NOTES
Assessment     32 y o , starting Nexplanon, no contraindications  Diagnoses and all orders for this visit:    Encounter for initial prescription of Nexplanon    Counseling for initiation of birth control method          Counseling / Coordination of Care  Total time spent today15 minutes  minutes  Greater than 50% of total time was spent with the patient and / or family counseling and / or coordination of care  Plan     All questions answered  Contraception: Nexplanon  Educational material distributed  Follow up in 1 week  Discussed with patient options for birth control including oral contraceptive pill, contraceptive patch, contraceptive ring, continue Depo-Provera, Intrauterine device, versus implant  She would like to try the Nexplanon for contraception  Discussed with patient risks and benefits including  potential side effects that can include irregular bleeding  Patient was given information on Nexplanon and we will check benefits  She will return to the office for insertion next week  I advised patient to continue to abstain from intercourse  We will do a pregnancy test at her next visit prior to insertion  Denny Saldana is a 32 y o  female who presents for contraception counseling  The patient has no complaints today  The patient is sexually active  Pertinent past medical history: migraines  patient with history of Mirena Intrauterine device that was removed due to pelvic pain  Patient has been on Depo-Provera since   Patient's last Depo-Provera was on 2018 and was due to be given between  to 2018  Patient has not been sexually active since   Depo-Provera has   She has not gotten a period since coming off the Depo-Provera  Patient is interested in the [address]        Menstrual History:  OB History      Para Term  AB Living    2 2   2 0 2    SAB TAB Ectopic Multiple Live Births    0 0 0 0 2 No LMP recorded  Patient has had an injection  The following portions of the patient's history were reviewed and updated as appropriate: allergies, current medications, past family history, past medical history, past social history, past surgical history and problem list     Review of Systems   Constitutional: Negative  HENT: Negative  Eyes: Negative  Respiratory: Negative  Cardiovascular: Negative  Gastrointestinal: Negative  Endocrine: Negative  Genitourinary:        As noted in HPI   Musculoskeletal: Negative  Skin: Negative  Allergic/Immunologic: Negative  Neurological: Negative  Hematological: Negative  Psychiatric/Behavioral: Negative  OBGyn Exam    Awake, alert, oriented and not in acute distress  The remainder of her physical examination was deferred as she was here today for consultation and discussion

## 2018-07-30 ENCOUNTER — PROCEDURE VISIT (OUTPATIENT)
Dept: OBGYN CLINIC | Facility: CLINIC | Age: 28
End: 2018-07-30
Payer: COMMERCIAL

## 2018-07-30 VITALS — BODY MASS INDEX: 31.52 KG/M2 | DIASTOLIC BLOOD PRESSURE: 80 MMHG | SYSTOLIC BLOOD PRESSURE: 128 MMHG | WEIGHT: 166.8 LBS

## 2018-07-30 DIAGNOSIS — Z30.017 NEXPLANON INSERTION: Primary | ICD-10-CM

## 2018-07-30 LAB — SL AMB POCT URINE HCG: NORMAL

## 2018-07-30 PROCEDURE — 81025 URINE PREGNANCY TEST: CPT | Performed by: OBSTETRICS & GYNECOLOGY

## 2018-07-30 PROCEDURE — 11981 INSERTION DRUG DLVR IMPLANT: CPT | Performed by: OBSTETRICS & GYNECOLOGY

## 2018-07-30 NOTE — PROGRESS NOTES
Remove and insert drug implant  Date/Time: 7/30/2018 2:47 PM  Performed by: Eleazar Strauss  Authorized by: Eleazar Strauss     Consent:     Consent obtained:  Written    Consent given by:  Patient    Procedural risks discussed:  Bleeding and infection    Patient questions answered: yes      Patient agrees, verbalizes understanding, and wants to proceed: yes      Educational handouts given: yes    Indication:     Indication: Insertion of non-biodegradable drug delivery implant    Pre-procedure:     Prepped with: povidone-iodine      Local anesthetic:  Lidocaine with epinephrine    The site was cleaned and prepped in a sterile fashion: yes    Procedure:     Procedure: Insertion    Left/right:  Left    Preloaded Implanon trocar was placed subdermally: yes      Visualization of implant was obtained: yes      Implanon was inserted and trocar removed: yes      Visualization of notch in stilette and palpitation of device: yes      Palpitation confirms placement by provider and patient: yes      Site was closed with steri-strips and pressure bandage applied: yes    Comments:       Follow-up in 1 week

## 2018-07-30 NOTE — PATIENT INSTRUCTIONS
Etonogestrel (Implant)   Etonogestrel (e-toe-brooks-HAY-trel)  Prevents pregnancy  Brand Name(s): Nexplanon   There may be other brand names for this medicine  When This Medicine Should Not Be Used: This medicine is not right for everyone  You should not receive it if you had an allergic reaction to etonogestrel, or if you are pregnant  Do not use it if you have breast cancer, heart disease, liver disease, or a history of blood clots (such as deep vein thrombosis, pulmonary embolism, or stroke)  How to Use This Medicine:   Implant  · A nurse or other trained health professional will give you this medicine  · This medicine is an implant  It will be surgically placed under the skin of your upper, inner arm  · Gently press your fingertips over the skin where this medicine was inserted  You should be able to feel the implant  · You might have to use another form of birth control for 7 days after the implant is inserted  Your doctor will tell you if this is needed  · Your doctor can remove the implant at any time if you want to stop using this medicine  The implant must be removed after 3 years, but you may have a new one inserted if you still want to use this form of birth control  · Read and follow the patient instructions that come with this medicine  Talk to your doctor or pharmacist if you have any questions  Drugs and Foods to Avoid:   Ask your doctor or pharmacist before using any other medicine, including over-the-counter medicines, vitamins, and herbal products  · Some foods and medicines can affect how etonogestrel works  Tell your doctor if you are using any of the following:  ¨ Bosentan, carbamazepine, cyclosporine, felbamate, griseofulvin, itraconazole, ketoconazole, lamotrigine, oxcarbazepine, phenobarbital, phenytoin, rifampin, Suzanne wort, topiramate  ¨ Medicine for HIV/AIDS  Warnings While Using This Medicine:   · Tell your doctor right away if you think you become pregnant   The implant will need to be removed  · Tell your doctor if you have cancer, blood circulation problems, high blood pressure, or kidney disease, or if you smoke  Tell your doctor if you are breastfeeding, or if you have recently given birth  Also tell your doctor if you have diabetes or prediabetes, high cholesterol, or a history of depression  · This medicine may cause the following problems:  ¨ Ectopic (tubal) pregnancy  ¨ Cysts in the ovaries  ¨ Possible risk of breast cancer  ¨ Higher risk of heart attack, stroke, or blood clots  ¨ Liver cancers or tumors  ¨ High blood pressure  · This medicine may change your usual menstrual cycle  You might have irregular bleeding, or your periods may be lighter, shorter, heavier, or longer  You might not have a period in some cycles  However, call your doctor if you think you are pregnant or if you have severe pain or changes that worry you  · This medicine will not protect you from HIV/AIDS or other sexually transmitted diseases  · You might need to have the implant removed if you will be inactive for a period of time, such as after major surgery, because of the risk of blood clots  · Tell any doctor or dentist who treats you that you are using this medicine  This medicine may affect certain medical test results  · Your doctor will check your progress and the effects of this medicine at regular visits  Keep all appointments  · Keep all medicine out of the reach of children  Never share your medicine with anyone    Possible Side Effects While Using This Medicine:   Call your doctor right away if you notice any of these side effects:  · Allergic reaction: Itching or hives, swelling in your face or hands, swelling or tingling in your mouth or throat, chest tightness, trouble breathing  · Chest pain, trouble breathing, or coughing up blood  · Dark urine or pale stools, nausea, vomiting, loss of appetite, stomach pain, yellow skin or eyes  · Double vision or other trouble seeing  · Numbness or weakness on one side of your body, sudden or severe headache, problems with vision, speech, or walking  · Pain in your lower leg (calf)  · Severe or ongoing pain, tingling, bleeding, bruising, redness, itching, or swelling where the implant is placed  · Unusual or severe pain in your abdomen  · Unusual or unexpected vaginal bleeding or heavy bleeding  If you notice these less serious side effects, talk with your doctor:   · Acne or pimples  · Mild headache  · Mild pain, tingling, bleeding, bruising, redness, itching, or swelling where the implant is placed  · Mood changes  · Weight gain  If you notice other side effects that you think are caused by this medicine, tell your doctor  Call your doctor for medical advice about side effects  You may report side effects to FDA at 6-272-FDA-8627  © 2017 [address] [employer] Information is for End User's use only and may not be sold, redistributed or otherwise used for commercial purposes  The above information is an  only  It is not intended as medical advice for individual conditions or treatments  Talk to your doctor, nurse or pharmacist before following any medical regimen to see if it is safe and effective for you

## 2018-08-02 ENCOUNTER — OFFICE VISIT (OUTPATIENT)
Dept: OBGYN CLINIC | Facility: CLINIC | Age: 28
End: 2018-08-02
Payer: COMMERCIAL

## 2018-08-02 VITALS — WEIGHT: 168 LBS | BODY MASS INDEX: 31.74 KG/M2 | DIASTOLIC BLOOD PRESSURE: 80 MMHG | SYSTOLIC BLOOD PRESSURE: 120 MMHG

## 2018-08-02 DIAGNOSIS — L03.114 CELLULITIS OF LEFT UPPER EXTREMITY: ICD-10-CM

## 2018-08-02 DIAGNOSIS — Z97.5 NEXPLANON IN PLACE: Primary | ICD-10-CM

## 2018-08-02 PROCEDURE — 99213 OFFICE O/P EST LOW 20 MIN: CPT | Performed by: OBSTETRICS & GYNECOLOGY

## 2018-08-02 RX ORDER — CEPHALEXIN 500 MG/1
500 CAPSULE ORAL EVERY 8 HOURS SCHEDULED
Qty: 21 CAPSULE | Refills: 0 | Status: SHIPPED | OUTPATIENT
Start: 2018-08-02 | End: 2018-08-09

## 2018-08-02 RX ORDER — METHYLPREDNISOLONE 4 MG/1
TABLET ORAL
Qty: 21 TABLET | Refills: 0 | Status: SHIPPED | OUTPATIENT
Start: 2018-08-02 | End: 2019-09-04 | Stop reason: ALTCHOICE

## 2018-08-02 NOTE — PROGRESS NOTES
Assessment/Plan:     Diagnoses and all orders for this visit:    Kiki Mccracken in place    Cellulitis of left upper extremity  -     cephalexin (KEFLEX) 500 mg capsule; Take 1 capsule (500 mg total) by mouth every 8 (eight) hours for 7 days  -     Methylprednisolone 4 MG TBPK; Use as directed on package            I suspect either an allergic reaction from bandage that was placed around the arm  She can also have a superficial cellulitis that is not and directly close to the insertion site  I prescribed Medrol Dosepak as well as cephalexin to treat either an infection versus allergic reaction  I advised patient to keep her appointment next week  I advised patient to stay out of work today and tomorrow  I told the patient to keep the arm open to air and to avoid any bandages  I asked patient to call if with any fevers or chills  Subjective   Patient ID: Be Lazcano is a 32 y o  female  OB History      Para Term  AB Living    2 2   2 0 2    SAB TAB Ectopic Multiple Live Births    0 0 0 0 2            Review of Systems   Constitutional: Negative  HENT: Negative  Eyes: Negative  Respiratory: Negative  Cardiovascular: Negative  Gastrointestinal: Negative  Endocrine: Negative  Genitourinary:        As noted in HPI   Musculoskeletal: Negative  Skin: Negative  Allergic/Immunologic: Negative  Neurological: Negative  Hematological: Negative  Psychiatric/Behavioral: Negative  Vitals:    18 1610   BP: 120/80         Physical Exam   Constitutional: She appears well-developed and well-nourished  No distress  Musculoskeletal:   Nexplanon is in place  The insertion site is well healed  There is no discharge or erythema or hematoma under the insertion site but there is ecchymosis      Around the arm circumferentially, there is warmth and redness         Menstrual History:  OB History      Para Term  AB Living    2 2   2 0 2    SAB TAB Ectopic Multiple Live Births    0 0 0 0 2           No LMP recorded  Patient has had an implant  The following portions of the patient's history were reviewed and updated as appropriate: allergies, current medications, past family history, past medical history, past social history, past surgical history and problem list       Counseling / Coordination of Care  Total time spent today20 minutes  minutes  Greater than 50% of total time was spent with the patient and / or family counseling and / or coordination of care

## 2018-08-02 NOTE — LETTER
August 2, 2018     Patient: Terrance Hopkins   YOB: 1990   Date of Visit: 8/2/2018       To Whom it May Concern:    Terrance Hopkins is under my professional care  She was seen in my office on 8/2/2018  Please excuse patient   from 08/02/2018  to 08/03/2018  Patient may return to work on August 6, 2018  If you have any questions or concerns, please don't hesitate to call           Sincerely,          Daisy Resendez MD        CC: No Recipients

## 2018-08-08 ENCOUNTER — OFFICE VISIT (OUTPATIENT)
Dept: OBGYN CLINIC | Facility: CLINIC | Age: 28
End: 2018-08-08
Payer: COMMERCIAL

## 2018-08-08 VITALS
WEIGHT: 164 LBS | DIASTOLIC BLOOD PRESSURE: 75 MMHG | SYSTOLIC BLOOD PRESSURE: 120 MMHG | HEIGHT: 61 IN | BODY MASS INDEX: 30.96 KG/M2

## 2018-08-08 DIAGNOSIS — R21 SKIN RASH: ICD-10-CM

## 2018-08-08 DIAGNOSIS — Z97.5 NEXPLANON IN PLACE: Primary | ICD-10-CM

## 2018-08-08 PROCEDURE — 99213 OFFICE O/P EST LOW 20 MIN: CPT | Performed by: OBSTETRICS & GYNECOLOGY

## 2018-08-08 NOTE — PROGRESS NOTES
Assessment/Plan:     Diagnoses and all orders for this visit:    Nexplanon in place    Skin rash  -     betamethasone valerate (VALISONE) 0 1 % cream; Apply topically 2 (two) times a day        I advised a topical cream for her skin rash  She should follow up with Dermatology or PCP if this persists  In the meantime, I asked for patient to call if with any problems from the [facility]  I asked for her to keep a menstrual calendar and call if with abnormal uterine bleeding  Follow-up in 2-3 weeks for annual gyn exam   Subjective   Patient ID: Clifford Lucia is a 32 y o  female  She is here for follow-up after Nexplanon insertion  She was seen last week where she complained of a rash in her left arm  It was noted that she may possibly have had an allergic reaction to with a pressure dressing  She denies abnormal uterine bleeding  She states the rash has resolved after she was given cephalexin as well as Medrol Dosepak  OB History      Para Term  AB Living    2 2   2 0 2    SAB TAB Ectopic Multiple Live Births    0 0 0 0 2            Review of Systems   Constitutional: Negative  HENT: Negative  Eyes: Negative  Respiratory: Negative  Cardiovascular: Negative  Gastrointestinal: Negative  Endocrine: Negative  Genitourinary:        As noted in HPI   Musculoskeletal: Negative  Skin: Negative  Allergic/Immunologic: Negative  Neurological: Negative  Hematological: Negative  Psychiatric/Behavioral: Negative  Vitals:    18 1354   BP: 120/75         Physical Exam   Constitutional: She is oriented to person, place, and time  She appears well-developed and well-nourished  Neurological: She is alert and oriented to person, place, and time  Skin: Skin is warm and dry  Rash noted  Psychiatric: She has a normal mood and affect  Her behavior is normal      her left arm was inspected where the Nexplanon was inserted    There was no hematoma or ecchymosis  The implant was palpated by myself and the patient  This skin is well healed and approximated well    There was no bleeding discharge or erythema  There is still a pinpoint rash noted on bilateral upper arms  Patient states that this has been present since prior to Nexplanon being placed  Menstrual History:  OB History      Para Term  AB Living    2 2   2 0 2    SAB TAB Ectopic Multiple Live Births    0 0 0 0 2           No LMP recorded  Patient has had an implant  The following portions of the patient's history were reviewed and updated as appropriate: allergies, current medications, past family history, past medical history, past social history, past surgical history and problem list       Counseling / Coordination of Care  Total time spent today15 minutes  minutes  Greater than 50% of total time was spent with the patient and / or family counseling and / or coordination of care

## 2018-08-30 ENCOUNTER — OFFICE VISIT (OUTPATIENT)
Dept: OBGYN CLINIC | Facility: CLINIC | Age: 28
End: 2018-08-30
Payer: COMMERCIAL

## 2018-08-30 VITALS
SYSTOLIC BLOOD PRESSURE: 98 MMHG | DIASTOLIC BLOOD PRESSURE: 64 MMHG | WEIGHT: 165.8 LBS | HEIGHT: 62 IN | BODY MASS INDEX: 30.51 KG/M2

## 2018-08-30 DIAGNOSIS — N64.3 GALACTORRHEA: ICD-10-CM

## 2018-08-30 DIAGNOSIS — Z01.419 ENCOUNTER FOR GYNECOLOGICAL EXAMINATION (GENERAL) (ROUTINE) WITHOUT ABNORMAL FINDINGS: Primary | ICD-10-CM

## 2018-08-30 DIAGNOSIS — N87.0 DYSPLASIA OF CERVIX, LOW GRADE (CIN 1): ICD-10-CM

## 2018-08-30 PROCEDURE — 87624 HPV HI-RISK TYP POOLED RSLT: CPT | Performed by: OBSTETRICS & GYNECOLOGY

## 2018-08-30 PROCEDURE — 88175 CYTOPATH C/V AUTO FLUID REDO: CPT | Performed by: OBSTETRICS & GYNECOLOGY

## 2018-08-30 PROCEDURE — 99395 PREV VISIT EST AGE 18-39: CPT | Performed by: OBSTETRICS & GYNECOLOGY

## 2018-08-30 NOTE — PATIENT INSTRUCTIONS
Breast Self Exam for Women   WHAT YOU NEED TO KNOW:   A BSE is a way to check your breasts for lumps and other changes  Regular BSEs can help you know how your breasts normally look and feel  Most breast lumps or changes are not cancer, but you should always have them checked by a healthcare provider  Your healthcare provider can also watch you do a BSE and can tell you if you are doing your BSE correctly  DISCHARGE INSTRUCTIONS:   Contact your healthcare provider if:   · You find any lumps or changes in your breasts  · You have breast pain or fluid coming from your nipples  · You have questions or concerns about your condition or care  Why you should do a BSE:  Breast cancer is the most common type of cancer in women  Even if you have mammograms, you may still want to do a BSE regularly  If you know how your breasts normally feel and look, it may help you know when to contact your healthcare provider  Mammograms can miss some cancers  You may find a lump during a BSE that did not show up on your mammogram   When you should do a BSE:  Delores Zheng your calendar to help you remember to do BSE on a regular schedule  One easy way to remember to do a BSE is to do the exam on the same day of each month  If you have periods, you may want to do your BSE 1 week after your period ends  This is the time when your breasts may be the least swollen, lumpy, or tender  You can do regular BSEs even if you are breastfeeding or have breast implants  How to do a BSE:   · Look at your breasts in a mirror  Look at the size and shape of each breast and nipple  Check for swelling, lumps, dimpling, scaly skin, or other skin changes  Look for nipple changes, such as a nipple that is painful or beginning to pull inward  Gently squeeze both nipples and check to see if fluid (that is not breast milk) comes out of them  If you find any of these or other breast changes, contact your healthcare provider   Check your breasts while you sit or  the following 3 positions:    [facility] your arms down at your sides  ¨ Raise your hands and join them behind your head  ¨ Put firm pressure with your hands on your hips  Bend slightly forward while you look at your breasts in the mirror  · Lie down and feel your breasts  When you lie down, your breast tissue spreads out evenly over your chest  This makes it easier for you to feel for lumps and anything that may not be normal for your breasts  Do a BSE on one breast at a time  ¨ Place a small pillow or towel under your left shoulder  Put your left arm behind your head  ¨ Use the 3 middle fingers of your right hand  Use your fingertip pads, on the top of your fingers  Your fingertip pad is the most sensitive part of your finger  ¨ Use small circles to feel your breast tissue  Use your fingertip pads to make dime-sized, overlapping circles on your breast and armpits  Use light, medium, and firm pressure  First, press lightly  Second, press with medium pressure to feel a little deeper into the breast  Last, use firm pressure to feel deep within your breast     ¨ Examine your entire breast area  Examine the breast area from above the breast to below the breast where you feel only ribs  Make small circles with your fingertips, starting in the middle of your armpit  Make circles going up and down the breast area  Continue toward your breast and all the way across it  Examine the area from your armpit all the way over to the middle of your chest (breastbone)  Stop at the middle of your chest     ¨ Move the pillow or towel to your right shoulder, and put your right arm behind your head  Use the 3 fingertip pads of your left hand, and repeat the above steps to do a BSE on your right breast        What else you can do to check for breast problems or cancer:  Some experts suggest that women 36years of age or older should have a mammogram every year   Other experts suggest that women between the ages of 48and 76years old should have a mammogram every 2 years  Talk to your healthcare provider about when you should have a mammogram   Follow up with your healthcare provider as directed: Your healthcare provider can watch you and tell you if you are doing your BSE correctly  Write down your questions so you remember to ask them during your visits  © 2017 [employer] Information is for End User's use only and may not be sold, redistributed or otherwise used for commercial purposes  All illustrations and images included in CareNotes® are the copyrighted property of A D A M [employer]  or [employer]  The above information is an  only  It is not intended as medical advice for individual conditions or treatments  Talk to your doctor, nurse or pharmacist before following any medical regimen to see if it is safe and effective for you  Wellness Visit for Adults   AMBULATORY CARE:   A wellness visit  is when you see your healthcare provider to get screened for health problems  You can also get advice on how to stay healthy  Write down your questions so you remember to ask them  Ask your healthcare provider how often you should have a wellness visit  What happens at a wellness visit:  Your healthcare provider will ask about your health, and your family history of health problems  This includes high blood pressure, heart disease, and cancer  He or she will ask if you have symptoms that concern you, if you smoke, and about your mood  You may also be asked about your intake of medicines, supplements, food, and alcohol  Any of the following may be done:  · Your weight  will be checked  Your height may also be checked so your body mass index (BMI) can be calculated  Your BMI shows if you are at a healthy weight  · Your blood pressure  and heart rate will be checked  Your temperature may also be checked  · Blood and urine tests  may be done   Blood tests may be done to check your cholesterol levels  Abnormal cholesterol levels increase your risk for heart disease and stroke  You may also need a blood or urine test to check for diabetes if you are at increased risk  Urine tests may be done to look for signs of an infection or kidney disease  · A physical exam  includes checking your heartbeat and lungs with a stethoscope  Your healthcare provider may also check your skin to look for sun damage  · Screening tests  may be recommended  A screening test is done to check for diseases that may not cause symptoms  The screening tests you may need depend on your age, gender, family history, and lifestyle habits  For example, colorectal screening may be recommended if you are 48years old or older  Screening tests you need if you are a woman:   · A Pap smear  is used to screen for cervical cancer  Pap smears are usually done every 3 to 5 years depending on your age  You may need them more often if you have had abnormal Pap smear test results in the past  Ask your healthcare provider how often you should have a Pap smear  · A mammogram  is an x-ray of your breasts to screen for breast cancer  Experts recommend mammograms every 2 years starting at age 48 years  You may need a mammogram at age 52 years or younger if you have an increased risk for breast cancer  Talk to your healthcare provider about when you should start having mammograms and how often you need them  Vaccines you may need:   · Get an influenza vaccine  every year  The influenza vaccine protects you from the flu  Several types of viruses cause the flu  The viruses change over time, so new vaccines are made each year  · Get a tetanus-diphtheria (Td) booster vaccine  every 10 years  This vaccine protects you against tetanus and diphtheria  Tetanus is a severe infection that may cause painful muscle spasms and lockjaw   Diphtheria is a severe bacterial infection that causes a thick covering in the back of your mouth and throat  · Get a human papillomavirus (HPV) vaccine  if you are female and aged 23 to 32 or male 23 to 24 and never received it  This vaccine protects you from HPV infection  HPV is the most common infection spread by sexual contact  HPV may also cause vaginal, penile, and anal cancers  · Get a pneumococcal vaccine  if you are aged 72 years or older  The pneumococcal vaccine is an injection given to protect you from pneumococcal disease  Pneumococcal disease is an infection caused by pneumococcal bacteria  The infection may cause pneumonia, meningitis, or an ear infection  · Get a shingles vaccine  if you are aged 61 or older, even if you have had shingles before  The shingles vaccine is an injection to protect you from the varicella-zoster virus  This is the same virus that causes chickenpox  Shingles is a painful rash that develops in people who had chickenpox or have been exposed to the virus  How to eat healthy:  My Plate is a model for planning healthy meals  It shows the types and amounts of foods that should go on your plate  Fruits and vegetables make up about half of your plate, and grains and protein make up the other half  A serving of dairy is included on the side of your plate  The amount of calories and serving sizes you need depends on your age, gender, weight, and height  Examples of healthy foods are listed below:  · Eat a variety of vegetables  such as dark green, red, and orange vegetables  You can also include canned vegetables low in sodium (salt) and frozen vegetables without added butter or sauces  · Eat a variety of fresh fruits , canned fruit in 100% juice, frozen fruit, and dried fruit  · Include whole grains  At least half of the grains you eat should be whole grains   Examples include whole-wheat bread, wheat pasta, brown rice, and whole-grain cereals such as oatmeal     · Eat a variety of protein foods such as seafood (fish and shellfish), lean meat, and poultry without skin (turkey and chicken)  Examples of lean meats include pork leg, shoulder, or tenderloin, and beef round, sirloin, tenderloin, and extra lean ground beef  Other protein foods include eggs and egg substitutes, beans, peas, soy products, nuts, and seeds  · Choose low-fat dairy products such as skim or 1% milk or low-fat yogurt, cheese, and cottage cheese  · Limit unhealthy fats  such as butter, hard margarine, and shortening  Exercise:  Exercise at least 30 minutes per day on most days of the week  Some examples of exercise include walking, biking, dancing, and swimming  You can also fit in more physical activity by taking the stairs instead of the elevator or parking farther away from stores  Include muscle strengthening activities 2 days each week  Regular exercise provides many health benefits  It helps you manage your weight, and decreases your risk for type 2 diabetes, heart disease, stroke, and high blood pressure  Exercise can also help improve your mood  Ask your healthcare provider about the best exercise plan for you  General health and safety guidelines:   · Do not smoke  Nicotine and other chemicals in cigarettes and cigars can cause lung damage  Ask your healthcare provider for information if you currently smoke and need help to quit  E-cigarettes or smokeless tobacco still contain nicotine  Talk to your healthcare provider before you use these products  · Limit alcohol  A drink of alcohol is 12 ounces of beer, 5 ounces of wine, or 1½ ounces of liquor  · Lose weight, if needed  Being overweight increases your risk of certain health conditions  These include heart disease, high blood pressure, type 2 diabetes, and certain types of cancer  · Protect your skin  Do not sunbathe or use tanning beds  Use sunscreen with a SPF 15 or higher  Apply sunscreen at least 15 minutes before you go outside  Reapply sunscreen every 2 hours   Wear protective clothing, hats, and sunglasses when you are outside  · Drive safely  Always wear your seatbelt  Make sure everyone in your car wears a seatbelt  A seatbelt can save your life if you are in an accident  Do not use your cell phone when you are driving  This could distract you and cause an accident  Pull over if you need to make a call or send a text message  · Practice safe sex  Use latex condoms if are sexually active and have more than one partner  Your healthcare provider may recommend screening tests for sexually transmitted infections (STIs)  · Wear helmets, lifejackets, and protective gear  Always wear a helmet when you ride a bike or motorcycle, go skiing, or play sports that could cause a head injury  Wear protective equipment when you play sports  Wear a lifejacket when you are on a boat or doing water sports  © 2017 [employer]  Information is for End User's use only and may not be sold, redistributed or otherwise used for commercial purposes  All illustrations and images included in CareNotes® are the copyrighted property of A [employer] M [employer] Inc  or Rich Reinoso  The above information is an  only  It is not intended as medical advice for individual conditions or treatments  Talk to your doctor, nurse or pharmacist before following any medical regimen to see if it is safe and effective for you

## 2018-08-30 NOTE — PROGRESS NOTES
Assessment        Diagnoses and all orders for this visit:    Encounter for gynecological examination (general) (routine) without abnormal findings    Dysplasia of cervix, low grade (LILIA 1)  -     Liquid-based pap, diagnostic    Galactorrhea  -     Prolactin; Future                  Plan      Await pap smear results  Blood tests: Prolactin hormone level  Breast self exam technique reviewed and patient encouraged to perform self-exam monthly  Contraception: Nexplanon  Follow up in 1 year  Follow up as needed  Thin prep Pap smear  Liam Mejia is a 32 y o  female who presents for annual exam   Patient had a history of LILIA 1 in 2017 status post cryotherapy  Patient presents for repeat cervical cytology  Patient was previously on Depo-Provera and was recently switched to [facility]  This was placed on 2018  She states that she has been producing milk from both breasts  There are no lumps or masses palpated  She denies any headaches  Patient stopped breastfeeding  In 2018  Current contraception: Nexplanon  History of abnormal Pap smear: yes - LILIA 1      Menstrual History:  OB History      Para Term  AB Living    2 2 0 2 0 2    SAB TAB Ectopic Multiple Live Births    0 0 0 0 2           No LMP recorded (lmp unknown)  Patient has had an injection  The following portions of the patient's history were reviewed and updated as appropriate: allergies, current medications, past family history, past medical history, past social history, past surgical history and problem list         Review of Systems   Constitutional: Negative  HENT: Negative  Eyes: Negative  Respiratory: Negative  Cardiovascular: Negative  Gastrointestinal: Negative  Endocrine: Negative  Genitourinary:        As noted in HPI   Musculoskeletal: Negative  Skin: Negative  Allergic/Immunologic: Negative  Neurological: Negative      Hematological: Negative  Psychiatric/Behavioral: Negative  Physical Exam   Constitutional: She is oriented to person, place, and time  She appears well-developed and well-nourished  Genitourinary: There is no rash or lesion on the right labia  There is no rash or lesion on the left labia  No bleeding in the vagina  No vaginal discharge found  Right adnexum does not display mass, does not display tenderness and does not display fullness  Left adnexum does not display mass, does not display tenderness and does not display fullness  Cervix does not exhibit motion tenderness, lesion or polyp  Uterus is not enlarged or tender  HENT:   Head: Normocephalic  Neck: No thyromegaly present  Cardiovascular: Normal rate, regular rhythm and normal heart sounds  No murmur heard  Pulmonary/Chest: Effort normal and breath sounds normal  No respiratory distress  She has no wheezes  She has no rales  Right breast exhibits no mass, no nipple discharge, no skin change and no tenderness  Left breast exhibits no mass, no nipple discharge, no skin change and no tenderness  Abdominal: Soft  She exhibits no distension and no mass  There is no tenderness  There is no rebound and no guarding  Musculoskeletal: She exhibits no edema or tenderness  Neurological: She is alert and oriented to person, place, and time  Skin: Skin is warm and dry  Psychiatric: She has a normal mood and affect

## 2018-08-31 ENCOUNTER — APPOINTMENT (OUTPATIENT)
Dept: LAB | Facility: HOSPITAL | Age: 28
End: 2018-08-31
Attending: OBSTETRICS & GYNECOLOGY
Payer: COMMERCIAL

## 2018-08-31 DIAGNOSIS — N64.3 GALACTORRHEA: ICD-10-CM

## 2018-08-31 LAB — PROLACTIN SERPL-MCNC: 20.4 NG/ML

## 2018-08-31 PROCEDURE — 36415 COLL VENOUS BLD VENIPUNCTURE: CPT

## 2018-08-31 PROCEDURE — 84146 ASSAY OF PROLACTIN: CPT

## 2018-09-04 ENCOUNTER — TELEPHONE (OUTPATIENT)
Dept: OBGYN CLINIC | Facility: CLINIC | Age: 28
End: 2018-09-04

## 2018-09-04 NOTE — TELEPHONE ENCOUNTER
----- Message from Daisy Resendez MD sent at 9/4/2018  7:28 AM EDT -----  Please notify the patient that her lab results were normal (PROLACTIN)

## 2018-09-05 LAB
HPV HR 12 DNA CVX QL NAA+PROBE: NEGATIVE
HPV16 DNA CVX QL NAA+PROBE: NEGATIVE
HPV18 DNA CVX QL NAA+PROBE: NEGATIVE

## 2018-09-06 ENCOUNTER — TELEPHONE (OUTPATIENT)
Dept: OBGYN CLINIC | Facility: CLINIC | Age: 28
End: 2018-09-06

## 2018-09-06 DIAGNOSIS — B96.89 BACTERIAL VAGINOSIS: Primary | ICD-10-CM

## 2018-09-06 DIAGNOSIS — N76.0 BACTERIAL VAGINOSIS: Primary | ICD-10-CM

## 2018-09-06 LAB
LAB AP GYN PRIMARY INTERPRETATION: NORMAL
Lab: NORMAL
PATH INTERP SPEC-IMP: NORMAL

## 2018-09-06 RX ORDER — METRONIDAZOLE 500 MG/1
500 TABLET ORAL EVERY 12 HOURS SCHEDULED
Qty: 14 TABLET | Refills: 0 | Status: SHIPPED | OUTPATIENT
Start: 2018-09-06 | End: 2018-09-13

## 2018-09-06 NOTE — TELEPHONE ENCOUNTER
Notify patient her Pap smears normal, HPV negative, however, PAP shows bacterial vaginosis  Will send antibiotics (FLAGYL) to pharmacy

## 2018-09-07 NOTE — TELEPHONE ENCOUNTER
Patient is aware of results  She is going to have the script transferred to [facility]

## 2018-10-24 ENCOUNTER — APPOINTMENT (OUTPATIENT)
Dept: LAB | Facility: HOSPITAL | Age: 28
End: 2018-10-24

## 2018-10-24 ENCOUNTER — TRANSCRIBE ORDERS (OUTPATIENT)
Dept: ADMINISTRATIVE | Facility: HOSPITAL | Age: 28
End: 2018-10-24

## 2018-10-24 DIAGNOSIS — Z11.1 TUBERCULOSIS SCREENING: ICD-10-CM

## 2018-10-24 DIAGNOSIS — Z11.1 TUBERCULOSIS SCREENING: Primary | ICD-10-CM

## 2018-10-24 PROCEDURE — 86480 TB TEST CELL IMMUN MEASURE: CPT

## 2018-10-24 PROCEDURE — 36415 COLL VENOUS BLD VENIPUNCTURE: CPT

## 2018-10-26 LAB
GAMMA INTERFERON BACKGROUND BLD IA-ACNC: 0.07 IU/ML
M TB IFN-G BLD-IMP: NEGATIVE
M TB IFN-G CD4+ BCKGRND COR BLD-ACNC: -0.01 IU/ML
M TB IFN-G CD4+ BCKGRND COR BLD-ACNC: 0 IU/ML
MITOGEN IGNF BCKGRD COR BLD-ACNC: >10 IU/ML

## 2019-02-13 ENCOUNTER — APPOINTMENT (OUTPATIENT)
Dept: LAB | Facility: HOSPITAL | Age: 29
End: 2019-02-13

## 2019-02-13 ENCOUNTER — TRANSCRIBE ORDERS (OUTPATIENT)
Dept: ADMINISTRATIVE | Facility: HOSPITAL | Age: 29
End: 2019-02-13

## 2019-02-13 DIAGNOSIS — Z11.1 SCREENING EXAMINATION FOR PULMONARY TUBERCULOSIS: ICD-10-CM

## 2019-02-13 DIAGNOSIS — Z11.1 SCREENING EXAMINATION FOR PULMONARY TUBERCULOSIS: Primary | ICD-10-CM

## 2019-02-13 PROCEDURE — 86480 TB TEST CELL IMMUN MEASURE: CPT

## 2019-02-13 PROCEDURE — 36415 COLL VENOUS BLD VENIPUNCTURE: CPT

## 2019-02-14 LAB
GAMMA INTERFERON BACKGROUND BLD IA-ACNC: 0.05 IU/ML
M TB IFN-G BLD-IMP: NEGATIVE
M TB IFN-G CD4+ BCKGRND COR BLD-ACNC: -0.01 IU/ML
M TB IFN-G CD4+ BCKGRND COR BLD-ACNC: 0 IU/ML
MITOGEN IGNF BCKGRD COR BLD-ACNC: >10 IU/ML

## 2019-03-06 ENCOUNTER — OFFICE VISIT (OUTPATIENT)
Dept: OBGYN CLINIC | Facility: CLINIC | Age: 29
End: 2019-03-06
Payer: COMMERCIAL

## 2019-03-06 VITALS
HEIGHT: 62 IN | WEIGHT: 173 LBS | DIASTOLIC BLOOD PRESSURE: 80 MMHG | SYSTOLIC BLOOD PRESSURE: 100 MMHG | BODY MASS INDEX: 31.83 KG/M2

## 2019-03-06 DIAGNOSIS — Z30.46 ENCOUNTER FOR NEXPLANON REMOVAL: ICD-10-CM

## 2019-03-06 DIAGNOSIS — N92.1 BREAKTHROUGH BLEEDING ON NEXPLANON: ICD-10-CM

## 2019-03-06 DIAGNOSIS — Z97.5 BREAKTHROUGH BLEEDING ON NEXPLANON: ICD-10-CM

## 2019-03-06 DIAGNOSIS — Z30.013 INITIATION OF DEPO PROVERA: Primary | ICD-10-CM

## 2019-03-06 PROCEDURE — 96372 THER/PROPH/DIAG INJ SC/IM: CPT | Performed by: OBSTETRICS & GYNECOLOGY

## 2019-03-06 PROCEDURE — 11982 REMOVE DRUG IMPLANT DEVICE: CPT | Performed by: OBSTETRICS & GYNECOLOGY

## 2019-03-06 PROCEDURE — 99213 OFFICE O/P EST LOW 20 MIN: CPT | Performed by: OBSTETRICS & GYNECOLOGY

## 2019-03-06 RX ORDER — MEDROXYPROGESTERONE ACETATE 150 MG/ML
150 INJECTION, SUSPENSION INTRAMUSCULAR ONCE
Status: CANCELLED | OUTPATIENT
Start: 2019-03-06 | End: 2019-03-06

## 2019-03-06 RX ORDER — MEDROXYPROGESTERONE ACETATE 150 MG/ML
150 INJECTION, SUSPENSION INTRAMUSCULAR
Qty: 1 ML | Refills: 3 | Status: SHIPPED | OUTPATIENT
Start: 2019-03-06 | End: 2019-09-04 | Stop reason: ALTCHOICE

## 2019-03-06 NOTE — PROGRESS NOTES
Assessment     25 y o , starting Depo-Provera injections, no contraindications  Diagnoses and all orders for this visit:    Breakthrough bleeding on Nexplanon    Encounter for Nexplanon removal  -     Remove and insert drug implant    Initiation of Depo Provera  -     medroxyPROGESTERone (DEPO-PROVERA) 150 mg/mL injection; Inject 1 mL (150 mg total) into a muscle every 3 (three) months          Counseling / Coordination of Care  Total time spent today15 minutes  minutes  Greater than 50% of total time was spent with the patient and / or family counseling and / or coordination of care  Plan     All questions answered  Contraception: Depo-Provera injections  Nexplanon was removed today  Depo-Provera to be initiated today  Patient to return to the office in 1 week for an incision check  She will also return to the office in 3 months for Depo-Provera injection  She previously toleratednDepo-Provera well with no problems  Kelvin Donovan is a 25 y o  female who presents for contraception counseling  The patient has no complaints today  The patient is sexually active  Pertinent past medical history: none  Patient requests Nexplanon removal due to abnormal uterine bleeding persistently for the past several months  She requests to be restarted on Depo-Provera      Review of Systems   Constitutional: Negative  HENT: Negative  Eyes: Negative  Respiratory: Negative  Cardiovascular: Negative  Gastrointestinal: Negative  Endocrine: Negative  Genitourinary:        As noted in HPI   Musculoskeletal: Negative  Skin: Negative  Allergic/Immunologic: Negative  Neurological: Negative  Hematological: Negative  Psychiatric/Behavioral: Negative          The following portions of the patient's history were reviewed and updated as appropriate: allergies, current medications, past family history, past medical history, past social history, past surgical history and problem list     Menstrual History:  OB History        2    Para   2    Term   0       2    AB   0    Living   2       SAB   0    TAB   0    Ectopic   0    Multiple   0    Live Births   2                  Patient's last menstrual period was 2019 (exact date)  OB History        2    Para   2    Term   0       2    AB   0    Living   2       SAB   0    TAB   0    Ectopic   0    Multiple   0    Live Births   2                 Past Medical History:   Diagnosis Date    Abnormal Pap smear of cervix     Asthma     NO MEDS FOR PAST SEVERAL MONTHS    Disease of thyroid gland     High-risk pregnancy in second trimester     LA    16   R   16     HPV (human papilloma virus) infection     Migraine        Past Surgical History:   Procedure Laterality Date    ABDOMINOPLASTY      CERVICAL BIOPSY  W/ LOOP ELECTRODE EXCISION      LIPOSUCTION         Social History     No Known Allergies      Current Outpatient Medications:     betamethasone valerate (VALISONE) 0 1 % cream, Apply topically 2 (two) times a day (Patient not taking: Reported on 3/6/2019), Disp: 30 g, Rfl: 4    estradiol (ESTRACE VAGINAL) 0 1 mg/g vaginal cream, Insert into the vagina, Disp: , Rfl:     levothyroxine (SYNTHROID) 75 mcg tablet, Take by mouth, Disp: , Rfl:     levothyroxine 75 mcg tablet, TAKE 1 TABLET (75 MCG TOTAL) BY MOUTH DAILY  NEW DOSE EFFECTIVE 16, Disp: , Rfl:     medroxyPROGESTERone (DEPO-PROVERA) 150 mg/mL injection, INJECT EVERY 12 WEEKS AS DIRECTED, Disp: , Rfl:     medroxyPROGESTERone (DEPO-PROVERA) 150 mg/mL injection, Inject 1 mL (150 mg total) into a muscle every 3 (three) months, Disp: 1 mL, Rfl: 3    Methylprednisolone 4 MG TBPK, Use as directed on package (Patient not taking: Reported on 2018 ), Disp: 21 tablet, Rfl: 0    Prenatal Vit-Fe Fumarate-FA (PRENATAL 1 PLUS 1 PO), Take 1 tablet by mouth daily  , Disp: , Rfl:         Physical Exam   Constitutional: She is oriented to person, place, and time  Vital signs are normal  She appears well-developed and well-nourished  No distress  Neurological: She is alert and oriented to person, place, and time  Skin: Skin is warm and dry  Psychiatric: She has a normal mood and affect  Her behavior is normal        Vitals:    03/06/19 1444   BP: 100/80       Weight (last 2 days)     Date/Time   Weight    03/06/19 1444   78 5 (173)              The remainder of her physical examination was deferred as she was here today for consultation and discussion

## 2019-03-06 NOTE — PROGRESS NOTES
Remove and insert drug implant  Date/Time: 3/6/2019 3:26 PM  Performed by: Kami Jaramillo MD  Authorized by: Kami Jaramillo MD     Consent:     Consent obtained:  Written    Consent given by:  Patient    Patient questions answered: yes      Patient agrees, verbalizes understanding, and wants to proceed: yes    Indication:     Indication: Presence of non-biodegradable drug delivery implant    Pre-procedure:     Prepped with: povidone-iodine      Local anesthetic:  Lidocaine 1%    The site was cleaned and prepped in a sterile fashion: yes    Procedure:     Procedure:  Removal    Small stab incision was made in arm: yes      Left/right:  Left  Comments: The implant was palpated on the left arm  The skin was marked with a pen  Betadine was used to clean the area  Lidocaine 1% was used to inject the area  A small incision was made over the tip of the Nexplanon  The Nexplanon was removed intact  The skin was closed with an interrupted suture of chromic 3 0  Sterile Band-Aid and pressure dressing was applied      Follow-up in 1 week

## 2019-03-07 RX ORDER — MEDROXYPROGESTERONE ACETATE 150 MG/ML
150 INJECTION, SUSPENSION INTRAMUSCULAR ONCE
Status: COMPLETED | OUTPATIENT
Start: 2019-03-07 | End: 2019-03-07

## 2019-03-07 RX ADMIN — MEDROXYPROGESTERONE ACETATE 150 MG: 150 INJECTION, SUSPENSION INTRAMUSCULAR at 14:46

## 2019-03-13 ENCOUNTER — OFFICE VISIT (OUTPATIENT)
Dept: OBGYN CLINIC | Facility: CLINIC | Age: 29
End: 2019-03-13
Payer: COMMERCIAL

## 2019-03-13 VITALS — BODY MASS INDEX: 30.91 KG/M2 | WEIGHT: 169 LBS

## 2019-03-13 DIAGNOSIS — Z30.42 DEPO-PROVERA CONTRACEPTIVE STATUS: ICD-10-CM

## 2019-03-13 DIAGNOSIS — Z30.46 ENCOUNTER FOR SURVEILLANCE OF NEXPLANON SUBDERMAL CONTRACEPTIVE: Primary | ICD-10-CM

## 2019-03-13 PROCEDURE — 99213 OFFICE O/P EST LOW 20 MIN: CPT | Performed by: OBSTETRICS & GYNECOLOGY

## 2019-03-13 NOTE — PROGRESS NOTES
Assessment/Plan:     Diagnoses and all orders for this visit:    Encounter for surveillance of Nexplanon subdermal contraceptive    Depo-Provera contraceptive status          Patient is doing well status post removal of Nexplanon  She was asked to call if with any concerns with this skin incision  She was started on Depo-Provera last time and she is doing well with this regard and has no complaints with regards to the Depo-Provera contraceptive status  She was asked to follow up in 3 months after her Depo-Provera injection  Follow-up as needed    Subjective   Patient ID: Amy Nielson is a 25 y o  female  Patient is here for a follow-up  Patient is here for Nexplanon removal check check  She has no complaints today  She denies pain or numbness or tingling at the removal site  She denies any bruising  She was started on Depo  at the time of removal     Menstrual History:  OB History        2    Para   2    Term   0       2    AB   0    Living   2       SAB   0    TAB   0    Ectopic   0    Multiple   0    Live Births   2                  Patient's last menstrual period was 2019 (exact date)  Past Medical History:   Diagnosis Date    Abnormal Pap smear of cervix     Asthma     NO MEDS FOR PAST SEVERAL MONTHS    Disease of thyroid gland     High-risk pregnancy in second trimester     LA    16   R   16     HPV (human papilloma virus) infection     Migraine        Past Surgical History:   Procedure Laterality Date    ABDOMINOPLASTY      CERVICAL BIOPSY  W/ LOOP ELECTRODE EXCISION      LIPOSUCTION         No Known Allergies      Current Outpatient Medications:     betamethasone valerate (VALISONE) 0 1 % cream, Apply topically 2 (two) times a day (Patient not taking: Reported on 3/6/2019), Disp: 30 g, Rfl: 4    estradiol (ESTRACE VAGINAL) 0 1 mg/g vaginal cream, Insert into the vagina, Disp: , Rfl:     levothyroxine (SYNTHROID) 75 mcg tablet, Take by mouth, Disp: , Rfl:     levothyroxine 75 mcg tablet, TAKE 1 TABLET (75 MCG TOTAL) BY MOUTH DAILY  NEW DOSE EFFECTIVE 1/27/16, Disp: , Rfl:     medroxyPROGESTERone (DEPO-PROVERA) 150 mg/mL injection, INJECT EVERY 12 WEEKS AS DIRECTED, Disp: , Rfl:     medroxyPROGESTERone (DEPO-PROVERA) 150 mg/mL injection, Inject 1 mL (150 mg total) into a muscle every 3 (three) months, Disp: 1 mL, Rfl: 3    Methylprednisolone 4 MG TBPK, Use as directed on package (Patient not taking: Reported on 8/30/2018 ), Disp: 21 tablet, Rfl: 0    Prenatal Vit-Fe Fumarate-FA (PRENATAL 1 PLUS 1 PO), Take 1 tablet by mouth daily  , Disp: , Rfl:       Review of Systems   Constitutional: Negative  HENT: Negative  Eyes: Negative  Respiratory: Negative  Cardiovascular: Negative  Gastrointestinal: Negative  Endocrine: Negative  Genitourinary:        As noted in HPI   Musculoskeletal: Negative  Skin: Negative  Allergic/Immunologic: Negative  Neurological: Negative  Hematological: Negative  Psychiatric/Behavioral: Negative  Physical Exam   Constitutional: She is oriented to person, place, and time  Vital signs are normal  She appears well-developed and well-nourished  No distress  Neurological: She is alert and oriented to person, place, and time  Skin: Skin is warm and dry  Psychiatric: She has a normal mood and affect  Her behavior is normal        Weight (last 2 days)     Date/Time   Weight    03/13/19 1411   76 7 (169)            Body mass index is 30 91 kg/m²  There were no vitals filed for this visit  Her left arm was inspected  where the Nexplanon was removed  There was no hematoma or ecchymosis  This skin is well healed and approximated well  There was no bleeding discharge or erythema          The following portions of the patient's history were reviewed and updated as appropriate: allergies, current medications, past family history, past medical history, past social history, past surgical history and problem list       Counseling / Coordination of Care  Total time spent today15 minutes  minutes  Greater than 50% of total time was spent with the patient and / or family counseling and / or coordination of care

## 2019-06-04 ENCOUNTER — OFFICE VISIT (OUTPATIENT)
Dept: OBGYN CLINIC | Facility: CLINIC | Age: 29
End: 2019-06-04
Payer: COMMERCIAL

## 2019-06-04 VITALS — WEIGHT: 169.8 LBS | DIASTOLIC BLOOD PRESSURE: 60 MMHG | SYSTOLIC BLOOD PRESSURE: 110 MMHG | BODY MASS INDEX: 31.06 KG/M2

## 2019-06-04 DIAGNOSIS — Z30.42 DEPO-PROVERA CONTRACEPTIVE STATUS: Primary | ICD-10-CM

## 2019-06-04 PROCEDURE — 96372 THER/PROPH/DIAG INJ SC/IM: CPT | Performed by: OBSTETRICS & GYNECOLOGY

## 2019-06-04 RX ORDER — MEDROXYPROGESTERONE ACETATE 150 MG/ML
150 INJECTION, SUSPENSION INTRAMUSCULAR ONCE
Status: COMPLETED | OUTPATIENT
Start: 2019-06-04 | End: 2019-06-04

## 2019-06-04 RX ADMIN — MEDROXYPROGESTERONE ACETATE 150 MG: 150 INJECTION, SUSPENSION INTRAMUSCULAR at 11:02

## 2019-08-03 ENCOUNTER — APPOINTMENT (EMERGENCY)
Dept: RADIOLOGY | Facility: HOSPITAL | Age: 29
End: 2019-08-03
Payer: COMMERCIAL

## 2019-08-03 ENCOUNTER — HOSPITAL ENCOUNTER (EMERGENCY)
Facility: HOSPITAL | Age: 29
Discharge: HOME | End: 2019-08-04
Attending: EMERGENCY MEDICINE | Admitting: EMERGENCY MEDICINE
Payer: COMMERCIAL

## 2019-08-03 DIAGNOSIS — R68.84 PAIN IN LOWER JAW: Primary | ICD-10-CM

## 2019-08-03 DIAGNOSIS — R68.84 JAW PAIN: ICD-10-CM

## 2019-08-03 LAB
BACTERIA UR QL AUTO: ABNORMAL /HPF
BILIRUB UR QL STRIP: NEGATIVE
CLARITY UR: ABNORMAL
COLOR UR: ABNORMAL
EXT PREG TEST URINE: NEGATIVE
EXT. CONTROL ED NAV: YES
GLUCOSE UR STRIP-MCNC: NEGATIVE MG/DL
HGB UR QL STRIP.AUTO: ABNORMAL
HYALINE CASTS #/AREA URNS LPF: ABNORMAL /LPF
KETONES UR STRIP-MCNC: NEGATIVE MG/DL
LEUKOCYTE ESTERASE UR QL STRIP: ABNORMAL
NITRITE UR QL STRIP: NEGATIVE
NON-SQ EPI CELLS URNS QL MICRO: ABNORMAL /HPF
PH UR STRIP.AUTO: 5.5 [PH] (ref 4.5–8)
PROT UR STRIP-MCNC: NEGATIVE MG/DL
RBC #/AREA URNS AUTO: ABNORMAL /HPF
SP GR UR STRIP.AUTO: 1.02 (ref 1–1.03)
UROBILINOGEN UR QL STRIP.AUTO: 0.2 E.U./DL
WBC #/AREA URNS AUTO: ABNORMAL /HPF

## 2019-08-03 PROCEDURE — 99284 EMERGENCY DEPT VISIT MOD MDM: CPT

## 2019-08-03 PROCEDURE — 81025 URINE PREGNANCY TEST: CPT | Performed by: EMERGENCY MEDICINE

## 2019-08-03 PROCEDURE — 87077 CULTURE AEROBIC IDENTIFY: CPT

## 2019-08-03 PROCEDURE — 96374 THER/PROPH/DIAG INJ IV PUSH: CPT

## 2019-08-03 PROCEDURE — 87186 SC STD MICRODIL/AGAR DIL: CPT

## 2019-08-03 PROCEDURE — 70491 CT SOFT TISSUE NECK W/DYE: CPT

## 2019-08-03 PROCEDURE — 81001 URINALYSIS AUTO W/SCOPE: CPT

## 2019-08-03 PROCEDURE — 87086 URINE CULTURE/COLONY COUNT: CPT

## 2019-08-03 RX ORDER — KETOROLAC TROMETHAMINE 30 MG/ML
15 INJECTION, SOLUTION INTRAMUSCULAR; INTRAVENOUS ONCE
Status: COMPLETED | OUTPATIENT
Start: 2019-08-03 | End: 2019-08-03

## 2019-08-03 RX ADMIN — KETOROLAC TROMETHAMINE 15 MG: 30 INJECTION, SOLUTION INTRAMUSCULAR at 23:26

## 2019-08-03 RX ADMIN — IOHEXOL 80 ML: 350 INJECTION, SOLUTION INTRAVENOUS at 23:40

## 2019-08-04 VITALS
TEMPERATURE: 98.6 F | HEART RATE: 81 BPM | SYSTOLIC BLOOD PRESSURE: 105 MMHG | DIASTOLIC BLOOD PRESSURE: 72 MMHG | BODY MASS INDEX: 31.24 KG/M2 | RESPIRATION RATE: 16 BRPM | HEIGHT: 62 IN | OXYGEN SATURATION: 98 % | WEIGHT: 169.75 LBS

## 2019-08-04 PROCEDURE — 99283 EMERGENCY DEPT VISIT LOW MDM: CPT | Performed by: EMERGENCY MEDICINE

## 2019-08-04 RX ORDER — OXYCODONE HYDROCHLORIDE 5 MG/1
5 TABLET ORAL EVERY 6 HOURS PRN
Qty: 4 TABLET | Refills: 0 | Status: SHIPPED | OUTPATIENT
Start: 2019-08-04 | End: 2019-09-04 | Stop reason: ALTCHOICE

## 2019-08-04 NOTE — ED PROVIDER NOTES
History  Chief Complaint   Patient presents with    Pain     pt had oral sx on tuesday, "each day the pain is getting worse and is all over my face now"      Patient is a 67-year-old female with recent wisdom tooth extraction who presents with left-sided dental pain  Patient says that she had the procedure done on Tuesday and was uncomplicated  Patient has a history of complicated with some tooth extraction  Patient tells me that her pain was improving up until Thursday at which time she had acute worsening  She describes moderate dull/aching left lower dental pain that is constant with paroxysm of sharp/shooting pain radiating from her left jaw up in the left side of her face  She also endorses mild associated dull/achy headache  She has been able to stay hydrated but does endorse decreased p o  Intake secondary to pain  She has been taking prescribed Percocet for pain with moderate relief  Patient has also been on amoxicillin to prevent infection  Patient attempted to reach out to her dentist yesterday but was unable to reach him  Patient denies any fevers, chills, nausea, vomiting, chest pain, dyspnea  She denies any recent trauma to the area  She denies any drainage from the tooth extraction site  She does endorse some swelling over her left cheek and under her left mandible  Patient endorses mild trismus but denies any voice change, drooling, inability to handle secretions  Prior to Admission Medications   Prescriptions Last Dose Informant Patient Reported? Taking? Methylprednisolone 4 MG TBPK Not Taking at Unknown time  No No   Sig: Use as directed on package   Patient not taking: Reported on 8/30/2018    Prenatal Vit-Fe Fumarate-FA (PRENATAL 1 PLUS 1 PO)   Yes No   Sig: Take 1 tablet by mouth daily     betamethasone valerate (VALISONE) 0 1 % cream Not Taking at Unknown time  No No   Sig: Apply topically 2 (two) times a day   Patient not taking: Reported on 6/4/2019   estradiol (ESTRACE VAGINAL) 0 1 mg/g vaginal cream Not Taking at Unknown time  Yes No   Sig: Insert into the vagina   levothyroxine (SYNTHROID) 75 mcg tablet   Yes No   Sig: Take by mouth   levothyroxine 75 mcg tablet   Yes No   Sig: TAKE 1 TABLET (75 MCG TOTAL) BY MOUTH DAILY  NEW DOSE EFFECTIVE 1/27/16   medroxyPROGESTERone (DEPO-PROVERA) 150 mg/mL injection   Yes No   Sig: INJECT EVERY 12 WEEKS AS DIRECTED   medroxyPROGESTERone (DEPO-PROVERA) 150 mg/mL injection   No No   Sig: Inject 1 mL (150 mg total) into a muscle every 3 (three) months      Facility-Administered Medications: None       Past Medical History:   Diagnosis Date    Abnormal Pap smear of cervix     Asthma     NO MEDS FOR PAST SEVERAL MONTHS    Disease of thyroid gland     High-risk pregnancy in second trimester     LA    4/1/16   R   4/6/16     HPV (human papilloma virus) infection     Migraine        Past Surgical History:   Procedure Laterality Date    ABDOMINOPLASTY      CERVICAL BIOPSY  W/ LOOP ELECTRODE EXCISION      LIPOSUCTION         Family History   Problem Relation Age of Onset    Arthritis Mother     Hypertension Father         Essential / hypertension iwth unspecified goal     Arthritis Sister     Lupus Sister         systemic erythematosus     Brain cancer Maternal Grandfather      I have reviewed and agree with the history as documented  Social History     Tobacco Use    Smoking status: Never Smoker    Smokeless tobacco: Never Used   Substance Use Topics    Alcohol use: Yes     Comment: social alcohol use per allscript     Drug use: No        Review of Systems   Constitutional: Negative for chills, diaphoresis, fatigue and fever  HENT: Positive for dental problem  Negative for facial swelling, sore throat and trouble swallowing  Respiratory: Negative for cough, chest tightness, shortness of breath and wheezing  Cardiovascular: Negative for chest pain     Gastrointestinal: Negative for abdominal distention, abdominal pain, diarrhea, nausea and vomiting  Genitourinary: Negative for dysuria  Musculoskeletal: Negative for back pain, neck pain and neck stiffness  Skin: Negative for color change, pallor, rash and wound  Neurological: Negative for weakness, light-headedness and numbness  Psychiatric/Behavioral: Negative for agitation  All other systems reviewed and are negative  Physical Exam  ED Triage Vitals [08/03/19 2224]   Temperature Pulse Respirations Blood Pressure SpO2   98 6 °F (37 °C) 79 18 110/72 98 %      Temp Source Heart Rate Source Patient Position - Orthostatic VS BP Location FiO2 (%)   Oral Monitor Lying Right arm --      Pain Score       8             Orthostatic Vital Signs  Vitals:    08/03/19 2224 08/03/19 2330   BP: 110/72 105/72   Pulse: 79 81   Patient Position - Orthostatic VS: Lying Lying       Physical Exam   Constitutional: She is oriented to person, place, and time  She appears well-developed and well-nourished  No distress  HENT:   Head: Normocephalic  Patient was significant swelling of the left side of her face and blood minimal   No obvious abscess noted intraorally  Bottom aspect of the oropharynx was compressible  Eyes: Pupils are equal, round, and reactive to light  Neck: Normal range of motion  Neck supple  Cardiovascular: Normal rate, regular rhythm, normal heart sounds and intact distal pulses  Pulmonary/Chest: Effort normal and breath sounds normal    Abdominal: Soft  Bowel sounds are normal  She exhibits no distension  There is no tenderness  There is no guarding  Musculoskeletal: Normal range of motion  She exhibits no edema, tenderness or deformity  Neurological: She is alert and oriented to person, place, and time  No cranial nerve deficit or sensory deficit  Skin: Skin is warm and dry  Capillary refill takes less than 2 seconds  Psychiatric: She has a normal mood and affect   Her behavior is normal  Judgment and thought content normal    Vitals reviewed  ED Medications  Medications   ketorolac (TORADOL) injection 15 mg (15 mg Intravenous Given 8/3/19 2326)   iohexol (OMNIPAQUE) 350 MG/ML injection (MULTI-DOSE) 80 mL (80 mL Intravenous Given 8/3/19 2340)       Diagnostic Studies  Results Reviewed     Procedure Component Value Units Date/Time    Urine Microscopic [[id number]]  (Abnormal) Collected:  08/03/19 2328    Lab Status:  Final result Specimen:  Urine Updated:  08/03/19 2336     RBC, UA None Seen /hpf      WBC, UA 30-50 /hpf      Epithelial Cells None Seen /hpf      Bacteria, UA Moderate /hpf      Hyaline Casts, UA 5-10 /lpf     Urine culture [[id number]] Collected:  08/03/19 2328    Lab Status: In process Specimen:  Urine Updated:  08/03/19 2336    POCT pregnancy, urine [[id number]]  (Normal) Resulted:  08/03/19 2331    Lab Status:  Final result Updated:  08/03/19 2331     EXT PREG TEST UR (Ref: Negative) negative     Control yes    ED Urine Macroscopic [[id number]]  (Abnormal) Collected:  08/03/19 2328    Lab Status:  Final result Specimen:  Urine Updated:  08/03/19 2322     Color, UA Sheree     Clarity, UA Slightly Cloudy     pH, UA 5 5     Leukocytes, UA Small     Nitrite, UA Negative     Protein, UA Negative mg/dl      Glucose, UA Negative mg/dl      Ketones, UA Negative mg/dl      Urobilinogen, UA 0 2 E U /dl      Bilirubin, UA Negative     Blood, UA Small     Specific [place], UA 1 025    Narrative:       CLINITEK RESULT                 CT soft tissue neck w contrast   Final Result by Steven Kerr DO (08/04 6619)      Phlegmonous changes along the left portion of the mandible without definite drainable fluid collection    Recommend follow-up with dental            Workstation performed: [id number]               Procedures  Procedures        ED Course                               MDM  Number of Diagnoses or Management Options  Jaw pain: new and requires workup  Pain in lower jaw: new and requires workup  Diagnosis management comments: Patient is a 25-year-old female who presents with worsening pain after surgical move her a wisdom tooth  CT scan was negative for any drainable abscess the  Patient will follow up with her dentist and continue remaining Percocet  Patient was given brief prescription for oxycodone and instructed to follow up with her dentist        Amount and/or Complexity of Data Reviewed  Clinical lab tests: ordered and reviewed  Tests in the radiology section of CPT®: ordered and reviewed    Risk of Complications, Morbidity, and/or Mortality  Presenting problems: moderate  Diagnostic procedures: low  Management options: low    Patient Progress  Patient progress: improved      Disposition  Final diagnoses:   Pain in lower jaw   Jaw pain     Time reflects when diagnosis was documented in both MDM as applicable and the Disposition within this note     Time User Action Codes Description Comment    8/4/2019 12:32 AM Blayne Garcia Add [R68 84] Pain in lower jaw     8/4/2019  1:18 AM Marylee Katrin Add [R68 84] Jaw pain       ED Disposition     ED Disposition Condition Date/Time Comment    Discharge Stable [place] Aug 4, 2019  1:18 AM Caryl Cates discharge to home/self care              Follow-up Information     Follow up With Specialties Details Why Contact Info    Jose Alberto Zaragoza MD Family Medicine  As needed [address]  [facility]  [address]  [facility] [address]             Discharge Medication List as of 8/4/2019  1:19 AM      START taking these medications    Details   oxyCODONE (ROXICODONE) 5 mg immediate release tablet Take 1 tablet (5 mg total) by mouth every 6 (six) hours as needed for moderate pain for up to 4 dosesMax Daily Amount: 20 mg, Starting Sun 8/4/2019, Print         CONTINUE these medications which have NOT CHANGED    Details   betamethasone valerate (VALISONE) 0 1 % cream Apply topically 2 (two) times a day, Starting Wed 8/8/2018, Normal      estradiol (ESTRACE VAGINAL) 0 1 mg/g vaginal cream Insert into the vagina, Starting Tue 2/28/2017, Historical Med      !! levothyroxine (SYNTHROID) 75 mcg tablet Take by mouth, Historical Med      !! levothyroxine 75 mcg tablet TAKE 1 TABLET (75 MCG TOTAL) BY MOUTH DAILY  NEW DOSE EFFECTIVE 1/27/16, Historical Med      !! medroxyPROGESTERone (DEPO-PROVERA) 150 mg/mL injection INJECT EVERY 12 WEEKS AS DIRECTED, Historical Med      !! medroxyPROGESTERone (DEPO-PROVERA) 150 mg/mL injection Inject 1 mL (150 mg total) into a muscle every 3 (three) months, Starting Wed 3/6/2019, Normal      Methylprednisolone 4 MG TBPK Use as directed on package, Normal      Prenatal Vit-Fe Fumarate-FA (PRENATAL 1 PLUS 1 PO) Take 1 tablet by mouth daily  , Until Discontinued, Historical Med       !! - Potential duplicate medications found  Please discuss with provider  No discharge procedures on file  ED Provider  Attending physically available and evaluated Brandon Prater I managed the patient along with the ED Attending      Electronically Signed by         Basil Patel MD  08/04/19 0962

## 2019-08-04 NOTE — ED ATTENDING ATTESTATION
Andree Mayen MD, saw and evaluated the patient  I have discussed the patient with the resident/non-physician practitioner and agree with the resident's/non-physician practitioner's findings, Plan of Care, and MDM as documented in the resident's/non-physician practitioner's note, except where noted  All available labs and Radiology studies were reviewed  I was present for key portions of any procedure(s) performed by the resident/non-physician practitioner and I was immediately available to provide assistance  At this point I agree with the current assessment done in the Emergency Department  I have conducted an independent evaluation of this patient a history and physical is as follows:   Pt had l lower wisdom tooth pulled Thursday started with worsening pain  No fevers    Pt states hse has ahd osme swelling after procedure and it is slightly worse PE: alert mild swelling under L chin l cheeck no obvious abscess or infection intraorally MDM: will do ct to eval  Pt is on antibiotics    Critical Care Time  Procedures

## 2019-08-04 NOTE — ED NOTES
Pt stated that she had her wisdom tooth pulled earlier in the week by "[facility]" and has an increase in pain  Pt "I have paged them like 4 times since 0900 and no one has returned my page" Pt c/o pain in the left side of her face that also goes into her throat  PT c/o pain with swallowing but is able handle he own saliva and speak in full sentences        Ghada Reeves RN  08/03/19 1366

## 2019-08-06 LAB — BACTERIA UR CULT: ABNORMAL

## 2019-08-26 ENCOUNTER — TELEPHONE (OUTPATIENT)
Dept: OBGYN CLINIC | Facility: CLINIC | Age: 29
End: 2019-08-26

## 2019-08-27 ENCOUNTER — TELEPHONE (OUTPATIENT)
Dept: OBGYN CLINIC | Facility: CLINIC | Age: 29
End: 2019-08-27

## 2019-08-27 NOTE — TELEPHONE ENCOUNTER
Pt to come in for depo on 9/4/19 9 am  She was advised to abstain form intercourse until then, Do UPT at that visit

## 2019-09-04 ENCOUNTER — CLINICAL SUPPORT (OUTPATIENT)
Dept: OBGYN CLINIC | Facility: CLINIC | Age: 29
End: 2019-09-04
Payer: COMMERCIAL

## 2019-09-04 VITALS
WEIGHT: 164.6 LBS | DIASTOLIC BLOOD PRESSURE: 62 MMHG | SYSTOLIC BLOOD PRESSURE: 108 MMHG | HEART RATE: 75 BPM | HEIGHT: 62 IN | BODY MASS INDEX: 30.29 KG/M2

## 2019-09-04 DIAGNOSIS — Z30.42 DEPOT CONTRACEPTION: Primary | ICD-10-CM

## 2019-09-04 LAB — SL AMB POCT URINE HCG: NEGATIVE

## 2019-09-04 PROCEDURE — 81025 URINE PREGNANCY TEST: CPT | Performed by: OBSTETRICS & GYNECOLOGY

## 2019-09-04 PROCEDURE — 96372 THER/PROPH/DIAG INJ SC/IM: CPT | Performed by: OBSTETRICS & GYNECOLOGY

## 2019-09-04 RX ORDER — MEDROXYPROGESTERONE ACETATE 150 MG/ML
INJECTION, SUSPENSION INTRAMUSCULAR
COMMUNITY
Start: 2019-06-04 | End: 2019-09-04 | Stop reason: ALTCHOICE

## 2019-09-04 RX ORDER — IBUPROFEN 600 MG/1
TABLET ORAL
Refills: 0 | COMMUNITY
Start: 2019-07-30 | End: 2020-05-13 | Stop reason: ALTCHOICE

## 2019-09-04 RX ORDER — ACETAMINOPHEN AND CODEINE PHOSPHATE 300; 30 MG/1; MG/1
TABLET ORAL
Refills: 0 | COMMUNITY
Start: 2019-07-30 | End: 2019-09-04 | Stop reason: ALTCHOICE

## 2019-09-04 RX ORDER — MEDROXYPROGESTERONE ACETATE 150 MG/ML
150 INJECTION, SUSPENSION INTRAMUSCULAR ONCE
Status: COMPLETED | OUTPATIENT
Start: 2019-09-04 | End: 2019-09-04

## 2019-09-04 RX ORDER — AMOXICILLIN 500 MG/1
CAPSULE ORAL
Refills: 0 | COMMUNITY
Start: 2019-07-30 | End: 2019-09-04 | Stop reason: ALTCHOICE

## 2019-09-04 RX ADMIN — MEDROXYPROGESTERONE ACETATE 150 MG: 150 INJECTION, SUSPENSION INTRAMUSCULAR at 09:04

## 2019-10-21 ENCOUNTER — APPOINTMENT (OUTPATIENT)
Dept: LAB | Facility: HOSPITAL | Age: 29
End: 2019-10-21
Payer: COMMERCIAL

## 2019-10-21 ENCOUNTER — TRANSCRIBE ORDERS (OUTPATIENT)
Dept: ADMINISTRATIVE | Facility: HOSPITAL | Age: 29
End: 2019-10-21

## 2019-10-21 DIAGNOSIS — E03.4 IDIOPATHIC ATROPHIC HYPOTHYROIDISM: ICD-10-CM

## 2019-10-21 DIAGNOSIS — E03.4 IDIOPATHIC ATROPHIC HYPOTHYROIDISM: Primary | ICD-10-CM

## 2019-10-21 LAB — TSH SERPL DL<=0.05 MIU/L-ACNC: 7.5 UIU/ML (ref 0.36–3.74)

## 2019-10-21 PROCEDURE — 36415 COLL VENOUS BLD VENIPUNCTURE: CPT

## 2019-10-21 PROCEDURE — 84443 ASSAY THYROID STIM HORMONE: CPT

## 2019-11-18 ENCOUNTER — TELEPHONE (OUTPATIENT)
Dept: OBGYN CLINIC | Facility: CLINIC | Age: 29
End: 2019-11-18

## 2019-11-18 DIAGNOSIS — Z30.42 ENCOUNTER FOR DEPO-PROVERA CONTRACEPTION: Primary | ICD-10-CM

## 2019-11-18 RX ORDER — MEDROXYPROGESTERONE ACETATE 150 MG/ML
150 INJECTION, SUSPENSION INTRAMUSCULAR
Qty: 1 ML | Refills: 1 | Status: SHIPPED | OUTPATIENT
Start: 2019-11-18 | End: 2020-11-26

## 2019-11-18 NOTE — TELEPHONE ENCOUNTER
Patient is coming in 12/02/2019 for annual visit and depo, could a refill be called into the [facility] on file for DEPO?

## 2019-12-04 ENCOUNTER — ANNUAL EXAM (OUTPATIENT)
Dept: OBGYN CLINIC | Facility: CLINIC | Age: 29
End: 2019-12-04
Payer: COMMERCIAL

## 2019-12-04 VITALS
HEIGHT: 62 IN | DIASTOLIC BLOOD PRESSURE: 68 MMHG | SYSTOLIC BLOOD PRESSURE: 112 MMHG | WEIGHT: 176.4 LBS | BODY MASS INDEX: 32.46 KG/M2

## 2019-12-04 DIAGNOSIS — Z01.419 ENCOUNTER FOR GYNECOLOGICAL EXAMINATION (GENERAL) (ROUTINE) WITHOUT ABNORMAL FINDINGS: Primary | ICD-10-CM

## 2019-12-04 DIAGNOSIS — K64.4 EXTERNAL HEMORRHOID: ICD-10-CM

## 2019-12-04 DIAGNOSIS — N87.0 DYSPLASIA OF CERVIX, LOW GRADE (CIN 1): ICD-10-CM

## 2019-12-04 DIAGNOSIS — Z30.42 ENCOUNTER FOR DEPO-PROVERA CONTRACEPTION: ICD-10-CM

## 2019-12-04 PROCEDURE — 99395 PREV VISIT EST AGE 18-39: CPT | Performed by: OBSTETRICS & GYNECOLOGY

## 2019-12-04 PROCEDURE — 96372 THER/PROPH/DIAG INJ SC/IM: CPT | Performed by: OBSTETRICS & GYNECOLOGY

## 2019-12-04 RX ORDER — MEDROXYPROGESTERONE ACETATE 150 MG/ML
150 INJECTION, SUSPENSION INTRAMUSCULAR
Qty: 1 ML | Refills: 3 | Status: SHIPPED | OUTPATIENT
Start: 2019-12-04 | End: 2020-11-26

## 2019-12-04 RX ORDER — DIAPER,BRIEF,INFANT-TODD,DISP
EACH MISCELLANEOUS 4 TIMES DAILY PRN
Qty: 45 G | Refills: 3 | Status: SHIPPED | OUTPATIENT
Start: 2019-12-04 | End: 2020-11-26

## 2019-12-04 RX ORDER — MEDROXYPROGESTERONE ACETATE 150 MG/ML
150 INJECTION, SUSPENSION INTRAMUSCULAR ONCE
Status: COMPLETED | OUTPATIENT
Start: 2019-12-04 | End: 2019-12-04

## 2019-12-04 RX ADMIN — MEDROXYPROGESTERONE ACETATE 150 MG: 150 INJECTION, SUSPENSION INTRAMUSCULAR at 04:00

## 2019-12-04 NOTE — PROGRESS NOTES
Assessment        Diagnoses and all orders for this visit:    Encounter for gynecological examination (general) (routine) without abnormal findings    Dysplasia of cervix, low grade (LILIA 1)  -     Liquid-based pap, diagnostic    Encounter for Depo-Provera contraception  -     medroxyPROGESTERone (DEPO-PROVERA) 150 mg/mL injection; Inject 1 mL (150 mg total) into a muscle every 3 (three) months  -     medroxyPROGESTERone acetate (DEPO-PROVERA SYRINGE) IM injection 150 mg    External hemorrhoid  -     hydrocortisone 1 % cream; Apply topically 4 (four) times a day as needed for irritation                  Plan      All questions answered  Breast self exam technique reviewed and patient encouraged to perform self-exam monthly  Contraception: Depo-Provera injections  Discussed healthy lifestyle modifications  Educational material distributed  Follow up in 1 year  Follow up as needed  Pap smear  Patient complaining of possibly a skin tag or hemorrhoid  She had a history of a prior 4th degree laceration and repair  Patient will stay on Depo-Provera for now  She is contemplating to a pregnant next year  Repeat Pap smear was performed due to history of LILIA 1  Cervix is noted to be flush to the vaginal wall  Follow-up in 3 months for repeat Depo-Provera injection  Discussed with patient use of hydrocortisone as needed for external skin irritation in the rectal area        Brian Campbell is a 30 y o  female who presents for annual exam       Chief Complaint   Patient presents with    Gynecologic Exam    Vaginal Irritation    Injections     Depo injection given in office today, administered in left buttocks per pts request  Tolerated well, no complaints        Pt with h/o LEEP in 2015 for LILIA 2 but LEEP pathology was negative      LILIA 1 in 2017 s/p cryocautery    Last Pap:   Last mammogram:   Colonoscopy:  DEXA:    Current contraception: Depo-Provera injections  History of abnormal Pap smear: yes - LILIA 1/2  History of abnormal mammogram: no  Family history of uterine or ovarian cancer: no  Family history of breast cancer: yes - maternal aunt    Menstrual History:  OB History        2    Para   2    Term   0       2    AB   0    Living   2       SAB   0    TAB   0    Ectopic   0    Multiple   0    Live Births   2                  No LMP recorded  Patient has had an injection  Past Medical History:   Diagnosis Date    Abnormal Pap smear of cervix     Asthma     NO MEDS FOR PAST SEVERAL MONTHS    Disease of thyroid gland     High-risk pregnancy in second trimester     LA    16   R   16     HPV (human papilloma virus) infection     Migraine      Past Surgical History:   Procedure Laterality Date    ABDOMINOPLASTY      CERVICAL BIOPSY  W/ LOOP ELECTRODE EXCISION      LIPOSUCTION       Social History     Socioeconomic History    Marital status: Single     Spouse name: Not on file    Number of children: Not on file    Years of education: Not on file    Highest education level: Not on file   Occupational History    Not on file   Social Needs    Financial resource strain: Not on file    Food insecurity:     Worry: Not on file     Inability: Not on file    Transportation needs:     Medical: Not on file     Non-medical: Not on file   Tobacco Use    Smoking status: Never Smoker    Smokeless tobacco: Never Used   Substance and Sexual Activity    Alcohol use: Yes     Comment: social alcohol use per [employer]script     Drug use: No    Sexual activity: Yes     Partners: Male     Birth control/protection: Injection   Lifestyle    Physical activity:     Days per week: Not on file     Minutes per session: Not on file    Stress: Not on file   Relationships    Social connections:     Talks on phone: Not on file     Gets together: Not on file     Attends Worship service: Not on file     Active member of club or organization: Not on file     Attends meetings of clubs or organizations: Not on file     Relationship status: Not on file    Intimate partner violence:     Fear of current or ex partner: Not on file     Emotionally abused: Not on file     Physically abused: Not on file     Forced sexual activity: Not on file   Other Topics Concern    Not on file   Social History Narrative    Always uses seat belt     No caffeine use          Current Outpatient Medications:     betamethasone valerate (VALISONE) 0 1 % cream, Apply topically 2 (two) times a day, Disp: 30 g, Rfl: 4    ibuprofen (MOTRIN) 600 mg tablet, TAKE 1 TABLET BY MOUTH EVERY 6 HOURS FOR THE FIRST 2 DAYS THEN USE AS NEEDED FOR PAIN, Disp: , Rfl: 0    levothyroxine (SYNTHROID) 75 mcg tablet, Take by mouth, Disp: , Rfl:     medroxyPROGESTERone (DEPO-PROVERA) 150 mg/mL injection, INJECT EVERY 12 WEEKS AS DIRECTED, Disp: , Rfl:     medroxyPROGESTERone (DEPO-PROVERA) 150 mg/mL injection, Inject 1 mL (150 mg total) into a muscle every 3 (three) months, Disp: 1 mL, Rfl: 1    medroxyPROGESTERone (DEPO-PROVERA) 150 mg/mL injection, Inject 1 mL (150 mg total) into a muscle every 3 (three) months, Disp: 1 mL, Rfl: 3    hydrocortisone 1 % cream, Apply topically 4 (four) times a day as needed for irritation, Disp: 45 g, Rfl: 3  No current facility-administered medications for this visit  No Known Allergies        Review of Systems   Constitutional: Negative  HENT: Negative  Eyes: Negative  Respiratory: Negative  Cardiovascular: Negative  Gastrointestinal: Negative  Endocrine: Negative  Genitourinary:        As noted in HPI   Musculoskeletal: Negative  Skin: Negative  Allergic/Immunologic: Negative  Neurological: Negative  Hematological: Negative  Psychiatric/Behavioral: Negative          /68 (BP Location: Right arm, Patient Position: Sitting, Cuff Size: Standard)   Ht 5' 2" (1 575 m)   Wt 80 kg (176 lb 6 4 oz)   BMI 32 26 kg/m²         Physical Exam   Constitutional: She is oriented to person, place, and time  She appears well-developed and well-nourished  Genitourinary: Vagina normal and uterus normal  Pelvic exam was performed with patient supine  There is no rash or lesion on the right labia  There is no rash or lesion on the left labia  Vagina exhibits rugosity  Vagina exhibits no lesion  No bleeding in the vagina  No vaginal discharge found  Right adnexum does not display mass, does not display tenderness and does not display fullness  Left adnexum does not display mass, does not display tenderness and does not display fullness  Cervix does not exhibit motion tenderness, lesion or polyp  Uterus is not enlarged or tender  Rectal exam shows external hemorrhoid  Genitourinary Comments: Perineum and anus: Normal    Pelvic support  - Vaginal outlet: normal  - Anterior vaginal wall: normal  - Posterior vaginal wall: normal    Bladder: normal    Urethra: normal    Urethral support: normal       HENT:   Head: Normocephalic  Neck: No thyromegaly present  Cardiovascular: Normal rate, regular rhythm and normal heart sounds  No murmur heard  Pulmonary/Chest: Effort normal and breath sounds normal  No respiratory distress  She has no wheezes  She has no rales  Right breast exhibits no mass, no nipple discharge, no skin change and no tenderness  Left breast exhibits no mass, no nipple discharge, no skin change and no tenderness  Abdominal: Soft  She exhibits no distension and no mass  There is no tenderness  There is no rebound and no guarding  Musculoskeletal: She exhibits no edema or tenderness  Lymphadenopathy:        Right: No inguinal adenopathy present  Left: No inguinal adenopathy present  Neurological: She is alert and oriented to person, place, and time  Skin: Skin is warm and dry  Psychiatric: She has a normal mood and affect

## 2019-12-04 NOTE — PATIENT INSTRUCTIONS
Breast Self Exam for Women   WHAT YOU NEED TO KNOW:   A BSE is a way to check your breasts for lumps and other changes  Regular BSEs can help you know how your breasts normally look and feel  Most breast lumps or changes are not cancer, but you should always have them checked by a healthcare provider  Your healthcare provider can also watch you do a BSE and can tell you if you are doing your BSE correctly  DISCHARGE INSTRUCTIONS:   Contact your healthcare provider if:   · You find any lumps or changes in your breasts  · You have breast pain or fluid coming from your nipples  · You have questions or concerns about your condition or care  Why you should do a BSE:  Breast cancer is the most common type of cancer in women  Even if you have mammograms, you may still want to do a BSE regularly  If you know how your breasts normally feel and look, it may help you know when to contact your healthcare provider  Mammograms can miss some cancers  You may find a lump during a BSE that did not show up on your mammogram   When you should do a BSE:  Oscar Larve your calendar to help you remember to do BSE on a regular schedule  One easy way to remember to do a BSE is to do the exam on the same day of each month  If you have periods, you may want to do your BSE 1 week after your period ends  This is the time when your breasts may be the least swollen, lumpy, or tender  You can do regular BSEs even if you are breastfeeding or have breast implants  How to do a BSE:   · Look at your breasts in a mirror  Look at the size and shape of each breast and nipple  Check for swelling, lumps, dimpling, scaly skin, or other skin changes  Look for nipple changes, such as a nipple that is painful or beginning to pull inward  Gently squeeze both nipples and check to see if fluid (that is not breast milk) comes out of them  If you find any of these or other breast changes, contact your healthcare provider   Check your breasts while you sit or  the following 3 positions:    [facility] your arms down at your sides  ¨ Raise your hands and join them behind your head  ¨ Put firm pressure with your hands on your hips  Bend slightly forward while you look at your breasts in the mirror  · Lie down and feel your breasts  When you lie down, your breast tissue spreads out evenly over your chest  This makes it easier for you to feel for lumps and anything that may not be normal for your breasts  Do a BSE on one breast at a time  ¨ Place a small pillow or towel under your left shoulder  Put your left arm behind your head  ¨ Use the 3 middle fingers of your right hand  Use your fingertip pads, on the top of your fingers  Your fingertip pad is the most sensitive part of your finger  ¨ Use small circles to feel your breast tissue  Use your fingertip pads to make dime-sized, overlapping circles on your breast and armpits  Use light, medium, and firm pressure  First, press lightly  Second, press with medium pressure to feel a little deeper into the breast  Last, use firm pressure to feel deep within your breast     ¨ Examine your entire breast area  Examine the breast area from above the breast to below the breast where you feel only ribs  Make small circles with your fingertips, starting in the middle of your armpit  Make circles going up and down the breast area  Continue toward your breast and all the way across it  Examine the area from your armpit all the way over to the middle of your chest (breastbone)  Stop at the middle of your chest     ¨ Move the pillow or towel to your right shoulder, and put your right arm behind your head  Use the 3 fingertip pads of your left hand, and repeat the above steps to do a BSE on your right breast        What else you can do to check for breast problems or cancer:  Some experts suggest that women 36years of age or older should have a mammogram every year   Other experts suggest that women between the ages of 48and 76years old should have a mammogram every 2 years  Talk to your healthcare provider about when you should have a mammogram   Follow up with your healthcare provider as directed: Your healthcare provider can watch you and tell you if you are doing your BSE correctly  Write down your questions so you remember to ask them during your visits  © 2017 [employer] [employer] Information is for End User's use only and may not be sold, redistributed or otherwise used for commercial purposes  All illustrations and images included in CareNotes® are the copyrighted property of [employer]  or [employer]  The above information is an  only  It is not intended as medical advice for individual conditions or treatments  Talk to your doctor, nurse or pharmacist before following any medical regimen to see if it is safe and effective for you  Wellness Visit for Adults   AMBULATORY CARE:   A wellness visit  is when you see your healthcare provider to get screened for health problems  You can also get advice on how to stay healthy  Write down your questions so you remember to ask them  Ask your healthcare provider how often you should have a wellness visit  What happens at a wellness visit:  Your healthcare provider will ask about your health, and your family history of health problems  This includes high blood pressure, heart disease, and cancer  He or she will ask if you have symptoms that concern you, if you smoke, and about your mood  You may also be asked about your intake of medicines, supplements, food, and alcohol  Any of the following may be done:  · Your weight  will be checked  Your height may also be checked so your body mass index (BMI) can be calculated  Your BMI shows if you are at a healthy weight  · Your blood pressure  and heart rate will be checked  Your temperature may also be checked  · Blood and urine tests  may be done   Blood tests may be done to check your cholesterol levels  Abnormal cholesterol levels increase your risk for heart disease and stroke  You may also need a blood or urine test to check for diabetes if you are at increased risk  Urine tests may be done to look for signs of an infection or kidney disease  · A physical exam  includes checking your heartbeat and lungs with a stethoscope  Your healthcare provider may also check your skin to look for sun damage  · Screening tests  may be recommended  A screening test is done to check for diseases that may not cause symptoms  The screening tests you may need depend on your age, gender, family history, and lifestyle habits  For example, colorectal screening may be recommended if you are 48years old or older  Screening tests you need if you are a woman:   · A Pap smear  is used to screen for cervical cancer  Pap smears are usually done every 3 to 5 years depending on your age  You may need them more often if you have had abnormal Pap smear test results in the past  Ask your healthcare provider how often you should have a Pap smear  · A mammogram  is an x-ray of your breasts to screen for breast cancer  Experts recommend mammograms every 2 years starting at age 48 years  You may need a mammogram at age 52 years or younger if you have an increased risk for breast cancer  Talk to your healthcare provider about when you should start having mammograms and how often you need them  Vaccines you may need:   · Get an influenza vaccine  every year  The influenza vaccine protects you from the flu  Several types of viruses cause the flu  The viruses change over time, so new vaccines are made each year  · Get a tetanus-diphtheria (Td) booster vaccine  every 10 years  This vaccine protects you against tetanus and diphtheria  Tetanus is a severe infection that may cause painful muscle spasms and lockjaw   Diphtheria is a severe bacterial infection that causes a thick covering in the back of your mouth and throat  · Get a human papillomavirus (HPV) vaccine  if you are female and aged 23 to 32 or male 23 to 24 and never received it  This vaccine protects you from HPV infection  HPV is the most common infection spread by sexual contact  HPV may also cause vaginal, penile, and anal cancers  · Get a pneumococcal vaccine  if you are aged 72 years or older  The pneumococcal vaccine is an injection given to protect you from pneumococcal disease  Pneumococcal disease is an infection caused by pneumococcal bacteria  The infection may cause pneumonia, meningitis, or an ear infection  · Get a shingles vaccine  if you are aged 61 or older, even if you have had shingles before  The shingles vaccine is an injection to protect you from the varicella-zoster virus  This is the same virus that causes chickenpox  Shingles is a painful rash that develops in people who had chickenpox or have been exposed to the virus  How to eat healthy:  My Plate is a model for planning healthy meals  It shows the types and amounts of foods that should go on your plate  Fruits and vegetables make up about half of your plate, and grains and protein make up the other half  A serving of dairy is included on the side of your plate  The amount of calories and serving sizes you need depends on your age, gender, weight, and height  Examples of healthy foods are listed below:  · Eat a variety of vegetables  such as dark green, red, and orange vegetables  You can also include canned vegetables low in sodium (salt) and frozen vegetables without added butter or sauces  · Eat a variety of fresh fruits , canned fruit in 100% juice, frozen fruit, and dried fruit  · Include whole grains  At least half of the grains you eat should be whole grains   Examples include whole-wheat bread, wheat pasta, brown rice, and whole-grain cereals such as oatmeal     · Eat a variety of protein foods such as seafood (fish and shellfish), lean meat, and poultry without skin (turkey and chicken)  Examples of lean meats include pork leg, shoulder, or tenderloin, and beef round, sirloin, tenderloin, and extra lean ground beef  Other protein foods include eggs and egg substitutes, beans, peas, soy products, nuts, and seeds  · Choose low-fat dairy products such as skim or 1% milk or low-fat yogurt, cheese, and cottage cheese  · Limit unhealthy fats  such as butter, hard margarine, and shortening  Exercise:  Exercise at least 30 minutes per day on most days of the week  Some examples of exercise include walking, biking, dancing, and swimming  You can also fit in more physical activity by taking the stairs instead of the elevator or parking farther away from stores  Include muscle strengthening activities 2 days each week  Regular exercise provides many health benefits  It helps you manage your weight, and decreases your risk for type 2 diabetes, heart disease, stroke, and high blood pressure  Exercise can also help improve your mood  Ask your healthcare provider about the best exercise plan for you  General health and safety guidelines:   · Do not smoke  Nicotine and other chemicals in cigarettes and cigars can cause lung damage  Ask your healthcare provider for information if you currently smoke and need help to quit  E-cigarettes or smokeless tobacco still contain nicotine  Talk to your healthcare provider before you use these products  · Limit alcohol  A drink of alcohol is 12 ounces of beer, 5 ounces of wine, or 1½ ounces of liquor  · Lose weight, if needed  Being overweight increases your risk of certain health conditions  These include heart disease, high blood pressure, type 2 diabetes, and certain types of cancer  · Protect your skin  Do not sunbathe or use tanning beds  Use sunscreen with a SPF 15 or higher  Apply sunscreen at least 15 minutes before you go outside  Reapply sunscreen every 2 hours   Wear protective clothing, hats, and sunglasses when you are outside  · Drive safely  Always wear your seatbelt  Make sure everyone in your car wears a seatbelt  A seatbelt can save your life if you are in an accident  Do not use your cell phone when you are driving  This could distract you and cause an accident  Pull over if you need to make a call or send a text message  · Practice safe sex  Use latex condoms if are sexually active and have more than one partner  Your healthcare provider may recommend screening tests for sexually transmitted infections (STIs)  · Wear helmets, lifejackets, and protective gear  Always wear a helmet when you ride a bike or motorcycle, go skiing, or play sports that could cause a head injury  Wear protective equipment when you play sports  Wear a lifejacket when you are on a boat or doing water sports  © 2017 [address] [place]  Information is for End User's use only and may not be sold, redistributed or otherwise used for commercial purposes  All illustrations and images included in CareNotes® are the copyrighted property of [employer]  or Rich Reinoso  The above information is an  only  It is not intended as medical advice for individual conditions or treatments  Talk to your doctor, nurse or pharmacist before following any medical regimen to see if it is safe and effective for you

## 2020-02-24 ENCOUNTER — CLINICAL SUPPORT (OUTPATIENT)
Dept: OBGYN CLINIC | Facility: CLINIC | Age: 30
End: 2020-02-24
Payer: COMMERCIAL

## 2020-02-24 VITALS
SYSTOLIC BLOOD PRESSURE: 112 MMHG | DIASTOLIC BLOOD PRESSURE: 62 MMHG | BODY MASS INDEX: 34.04 KG/M2 | HEIGHT: 62 IN | WEIGHT: 185 LBS

## 2020-02-24 DIAGNOSIS — Z30.42 ENCOUNTER FOR DEPO-PROVERA CONTRACEPTION: Primary | ICD-10-CM

## 2020-02-24 PROCEDURE — 96372 THER/PROPH/DIAG INJ SC/IM: CPT | Performed by: OBSTETRICS & GYNECOLOGY

## 2020-02-25 RX ORDER — MEDROXYPROGESTERONE ACETATE 150 MG/ML
150 INJECTION, SUSPENSION INTRAMUSCULAR ONCE
Status: COMPLETED | OUTPATIENT
Start: 2020-02-25 | End: 2020-02-25

## 2020-02-25 RX ADMIN — MEDROXYPROGESTERONE ACETATE 150 MG: 150 INJECTION, SUSPENSION INTRAMUSCULAR at 12:38

## 2020-05-12 ENCOUNTER — TELEPHONE (OUTPATIENT)
Dept: OBGYN CLINIC | Facility: CLINIC | Age: 30
End: 2020-05-12

## 2020-05-13 ENCOUNTER — CLINICAL SUPPORT (OUTPATIENT)
Dept: OBGYN CLINIC | Facility: CLINIC | Age: 30
End: 2020-05-13
Payer: COMMERCIAL

## 2020-05-13 VITALS
BODY MASS INDEX: 36.66 KG/M2 | SYSTOLIC BLOOD PRESSURE: 118 MMHG | DIASTOLIC BLOOD PRESSURE: 72 MMHG | HEIGHT: 62 IN | WEIGHT: 199.2 LBS | TEMPERATURE: 99.2 F | HEART RATE: 98 BPM

## 2020-05-13 DIAGNOSIS — Z30.42 ENCOUNTER FOR DEPO-PROVERA CONTRACEPTION: Primary | ICD-10-CM

## 2020-05-13 PROCEDURE — 96372 THER/PROPH/DIAG INJ SC/IM: CPT | Performed by: OBSTETRICS & GYNECOLOGY

## 2020-05-13 RX ORDER — MEDROXYPROGESTERONE ACETATE 150 MG/ML
150 INJECTION, SUSPENSION INTRAMUSCULAR ONCE
Status: CANCELLED | OUTPATIENT
Start: 2020-05-13 | End: 2020-05-13

## 2020-05-13 RX ORDER — MEDROXYPROGESTERONE ACETATE 150 MG/ML
150 INJECTION, SUSPENSION INTRAMUSCULAR ONCE
Status: COMPLETED | OUTPATIENT
Start: 2020-05-13 | End: 2020-05-13

## 2020-05-13 RX ADMIN — MEDROXYPROGESTERONE ACETATE 150 MG: 150 INJECTION, SUSPENSION INTRAMUSCULAR at 10:28

## 2020-08-03 ENCOUNTER — CLINICAL SUPPORT (OUTPATIENT)
Dept: OBGYN CLINIC | Facility: CLINIC | Age: 30
End: 2020-08-03
Payer: COMMERCIAL

## 2020-08-03 VITALS
WEIGHT: 198.6 LBS | DIASTOLIC BLOOD PRESSURE: 64 MMHG | TEMPERATURE: 98.5 F | BODY MASS INDEX: 36.55 KG/M2 | SYSTOLIC BLOOD PRESSURE: 108 MMHG | HEART RATE: 73 BPM | HEIGHT: 62 IN

## 2020-08-03 DIAGNOSIS — Z30.42 DEPO-PROVERA CONTRACEPTIVE STATUS: Primary | ICD-10-CM

## 2020-08-03 PROCEDURE — 96372 THER/PROPH/DIAG INJ SC/IM: CPT | Performed by: OBSTETRICS & GYNECOLOGY

## 2020-08-03 RX ORDER — MEDROXYPROGESTERONE ACETATE 150 MG/ML
150 INJECTION, SUSPENSION INTRAMUSCULAR ONCE
Status: COMPLETED | OUTPATIENT
Start: 2020-08-03 | End: 2020-08-03

## 2020-08-03 RX ORDER — DIPHENOXYLATE HYDROCHLORIDE AND ATROPINE SULFATE 2.5; .025 MG/1; MG/1
1 TABLET ORAL DAILY
COMMUNITY
End: 2020-11-26

## 2020-08-03 RX ORDER — LEVOTHYROXINE SODIUM 88 UG/1
100 TABLET ORAL DAILY
COMMUNITY
Start: 2020-07-14 | End: 2021-07-20

## 2020-08-03 RX ADMIN — MEDROXYPROGESTERONE ACETATE 150 MG: 150 INJECTION, SUSPENSION INTRAMUSCULAR at 11:41

## 2020-10-30 ENCOUNTER — CLINICAL SUPPORT (OUTPATIENT)
Dept: OBGYN CLINIC | Facility: CLINIC | Age: 30
End: 2020-10-30
Payer: COMMERCIAL

## 2020-10-30 VITALS
SYSTOLIC BLOOD PRESSURE: 112 MMHG | BODY MASS INDEX: 32.42 KG/M2 | DIASTOLIC BLOOD PRESSURE: 64 MMHG | WEIGHT: 176.2 LBS | HEART RATE: 87 BPM | HEIGHT: 62 IN

## 2020-10-30 DIAGNOSIS — Z30.42 DEPOT CONTRACEPTION: Primary | ICD-10-CM

## 2020-10-30 PROCEDURE — 96372 THER/PROPH/DIAG INJ SC/IM: CPT | Performed by: OBSTETRICS & GYNECOLOGY

## 2020-10-30 RX ORDER — MEDROXYPROGESTERONE ACETATE 150 MG/ML
150 INJECTION, SUSPENSION INTRAMUSCULAR ONCE
Status: COMPLETED | OUTPATIENT
Start: 2020-10-30 | End: 2020-10-30

## 2020-10-30 RX ORDER — ACETAMINOPHEN 500 MG
1000 TABLET ORAL EVERY 8 HOURS PRN
COMMUNITY
Start: 2020-08-13 | End: 2020-11-26

## 2020-10-30 RX ORDER — ALBUTEROL SULFATE 90 UG/1
1 AEROSOL, METERED RESPIRATORY (INHALATION) EVERY 6 HOURS PRN
COMMUNITY
Start: 2020-10-22 | End: 2021-10-22

## 2020-10-30 RX ORDER — ONDANSETRON 4 MG/1
4 TABLET, FILM COATED ORAL EVERY 8 HOURS PRN
COMMUNITY
Start: 2020-08-13 | End: 2020-11-26

## 2020-10-30 RX ADMIN — MEDROXYPROGESTERONE ACETATE 150 MG: 150 INJECTION, SUSPENSION INTRAMUSCULAR at 09:41

## 2020-11-26 ENCOUNTER — HOSPITAL ENCOUNTER (EMERGENCY)
Facility: HOSPITAL | Age: 30
Discharge: HOME | End: 2020-11-26
Attending: EMERGENCY MEDICINE
Payer: COMMERCIAL

## 2020-11-26 VITALS
RESPIRATION RATE: 16 BRPM | SYSTOLIC BLOOD PRESSURE: 110 MMHG | WEIGHT: 160 LBS | TEMPERATURE: 98 F | OXYGEN SATURATION: 98 % | BODY MASS INDEX: 29.26 KG/M2 | HEART RATE: 76 BPM | DIASTOLIC BLOOD PRESSURE: 67 MMHG

## 2020-11-26 DIAGNOSIS — N12 PYELONEPHRITIS: ICD-10-CM

## 2020-11-26 DIAGNOSIS — M54.50 ACUTE LOW BACK PAIN: Primary | ICD-10-CM

## 2020-11-26 LAB
BACTERIA UR QL AUTO: ABNORMAL /HPF
BILIRUB UR QL STRIP: ABNORMAL
CLARITY UR: ABNORMAL
COLOR UR: YELLOW
EXT PREG TEST URINE: NORMAL
EXT. CONTROL ED NAV: NORMAL
GLUCOSE UR STRIP-MCNC: NEGATIVE MG/DL
HGB UR QL STRIP.AUTO: ABNORMAL
KETONES UR STRIP-MCNC: ABNORMAL MG/DL
LEUKOCYTE ESTERASE UR QL STRIP: ABNORMAL
NITRITE UR QL STRIP: POSITIVE
NON-SQ EPI CELLS URNS QL MICRO: ABNORMAL /HPF
PH UR STRIP.AUTO: 6.5 [PH] (ref 4.5–8)
PROT UR STRIP-MCNC: NEGATIVE MG/DL
RBC #/AREA URNS AUTO: ABNORMAL /HPF
SP GR UR STRIP.AUTO: >=1.03 (ref 1–1.03)
UROBILINOGEN UR QL STRIP.AUTO: 1 E.U./DL
WBC #/AREA URNS AUTO: ABNORMAL /HPF

## 2020-11-26 PROCEDURE — 99284 EMERGENCY DEPT VISIT MOD MDM: CPT | Performed by: EMERGENCY MEDICINE

## 2020-11-26 PROCEDURE — 99283 EMERGENCY DEPT VISIT LOW MDM: CPT

## 2020-11-26 PROCEDURE — 81025 URINE PREGNANCY TEST: CPT | Performed by: EMERGENCY MEDICINE

## 2020-11-26 PROCEDURE — 96372 THER/PROPH/DIAG INJ SC/IM: CPT

## 2020-11-26 PROCEDURE — 81001 URINALYSIS AUTO W/SCOPE: CPT

## 2020-11-26 RX ORDER — LIDOCAINE 50 MG/G
1 PATCH TOPICAL ONCE
Status: DISCONTINUED | OUTPATIENT
Start: 2020-11-26 | End: 2020-11-26 | Stop reason: HOSPADM

## 2020-11-26 RX ORDER — NAPROXEN 250 MG/1
250 TABLET ORAL
Qty: 21 TABLET | Refills: 0 | Status: SHIPPED | OUTPATIENT
Start: 2020-11-26 | End: 2022-05-23 | Stop reason: ALTCHOICE

## 2020-11-26 RX ORDER — LIDOCAINE 50 MG/G
1 PATCH TOPICAL EVERY 24 HOURS
Qty: 7 PATCH | Refills: 0 | Status: SHIPPED | OUTPATIENT
Start: 2020-11-26 | End: 2022-05-23 | Stop reason: ALTCHOICE

## 2020-11-26 RX ORDER — CYCLOBENZAPRINE HCL 5 MG
5 TABLET ORAL 3 TIMES DAILY PRN
Qty: 12 TABLET | Refills: 0 | Status: SHIPPED | OUTPATIENT
Start: 2020-11-26 | End: 2022-05-23 | Stop reason: ALTCHOICE

## 2020-11-26 RX ORDER — CEPHALEXIN 500 MG/1
500 CAPSULE ORAL EVERY 8 HOURS SCHEDULED
Qty: 30 CAPSULE | Refills: 0 | Status: SHIPPED | OUTPATIENT
Start: 2020-11-26 | End: 2020-12-06

## 2020-11-26 RX ORDER — KETOROLAC TROMETHAMINE 30 MG/ML
15 INJECTION, SOLUTION INTRAMUSCULAR; INTRAVENOUS ONCE
Status: COMPLETED | OUTPATIENT
Start: 2020-11-26 | End: 2020-11-26

## 2020-11-26 RX ADMIN — KETOROLAC TROMETHAMINE 15 MG: 30 INJECTION, SOLUTION INTRAMUSCULAR at 09:43

## 2020-11-26 RX ADMIN — LIDOCAINE 1 PATCH: 50 PATCH TOPICAL at 09:43

## 2021-01-04 ENCOUNTER — OFFICE VISIT (OUTPATIENT)
Dept: OBGYN CLINIC | Facility: CLINIC | Age: 31
End: 2021-01-04
Payer: COMMERCIAL

## 2021-01-04 VITALS
HEIGHT: 62 IN | WEIGHT: 161.2 LBS | DIASTOLIC BLOOD PRESSURE: 60 MMHG | SYSTOLIC BLOOD PRESSURE: 98 MMHG | TEMPERATURE: 98.2 F | HEART RATE: 68 BPM | BODY MASS INDEX: 29.66 KG/M2

## 2021-01-04 DIAGNOSIS — Z30.42 DEPOT CONTRACEPTION: ICD-10-CM

## 2021-01-04 DIAGNOSIS — Z30.09 BIRTH CONTROL COUNSELING: Primary | ICD-10-CM

## 2021-01-04 DIAGNOSIS — Z30.09 COUNSELING FOR BIRTH CONTROL REGARDING INTRAUTERINE DEVICE (IUD): ICD-10-CM

## 2021-01-04 PROCEDURE — 99213 OFFICE O/P EST LOW 20 MIN: CPT | Performed by: OBSTETRICS & GYNECOLOGY

## 2021-01-04 RX ORDER — PNV NO.95/FERROUS FUM/FOLIC AC 28MG-0.8MG
TABLET ORAL
COMMUNITY

## 2021-01-04 RX ORDER — MAGNESIUM GLUCONATE 30 MG(550)
TABLET ORAL
COMMUNITY

## 2021-01-04 RX ORDER — DIPHENOXYLATE HYDROCHLORIDE AND ATROPINE SULFATE 2.5; .025 MG/1; MG/1
1 TABLET ORAL DAILY
COMMUNITY

## 2021-01-04 RX ORDER — MISOPROSTOL 200 UG/1
200 TABLET ORAL DAILY
Qty: 2 TABLET | Refills: 0 | Status: SHIPPED | OUTPATIENT
Start: 2021-01-04 | End: 2021-07-20

## 2021-01-04 NOTE — PROGRESS NOTES
Assessment     26 y o , starting IUD, no contraindications  Diagnoses and all orders for this visit:    Birth control counseling    Depot contraception    Counseling for birth control regarding intrauterine device (IUD)  -     misoprostol (CYTOTEC) 200 mcg tablet; Take 1 tablet (200 mcg total) by mouth daily for 2 days Prior to procedure    Other orders  -     multivitamin (THERAGRAN) TABS; Take 1 tablet by mouth daily  -     Potassium Gluconate (HM Potassium) 595 (99 K) MG TABS; Take by mouth  -     Calcium-Magnesium-Vitamin D (CALCIUM 1200+D3 PO); Take by mouth  -     Ferrous Sulfate (Iron) 325 (65 Fe) MG TABS; Take by mouth            Plan     All questions answered  Contraception: IUD  Educational material distributed  Follow up in 1 week  Follow up as needed  Discussed with patient that prolonged Depo-Provera use can certainly cause bone marrow switch is most likely reversible  Discussed with patient option of supplementing with calcium and vitamin-D  Prolonged use over 2 years is not of contraindication to continue Depo-Provera use, especially if patient is tolerating well and does not have any reliable options for contraception  However, patient is strongly considering Intrauterine device  Patient counseled on contraceptive options including oral contraceptive pills,contraceptive patch, vaginal ring  She was also counseled on long-acting reversible contraception including intrauterine device versus his contraceptive implant  Patient was counseled on intrauterine devise options including hormone free or copper Intrauterine device versus levonorgesterel IUD  She was counseled on risks associated with Intrauterine device use including uterine perforation, expulsion, malpositioned IUD  She was counseled on potential side effects and complications  Patient requests to start intrauterine device  We will check insurance benefits the patient will return to the office for insertion      She requests to start progestin Intrauterine device  Discussed additional side effects from progestin Intrauterine device including irregular bleeding, hormonal side effects, increased incidence of ovarian cysts, vaginitis  Her Depo-Provera is good January 15 to 2021  She should return to the office in 1 week for Intrauterine device insertion  She was given CYTOTEC preprocedure  Should perform urine pregnancy test prior to Intrauterine device insertion    She has completed childbearing and is somewhat interested in permanent sterilization  However, patient with history of abdominoplasty and recent sleeve gastrectomy  She is at risk for complications such as bowel and bladder injury due to multiple abdominal surgeries and if she is able to tolerate Intrauterine device without any complications, she can continue this without the need to undergo abdominal surgery      Subjective      Amparo Plan is a 26 y o  female who presents for contraception counseling  The patient has no complaints today  The patient is sexually active  Pertinent past medical history: none  Patient was recently recommended by her endocrinologist to stop Depo-Provera due to prolonged use and concerns for bone loss  Patient is interested in Intrauterine device and presents to discuss options for contraception    Menstrual History:  OB History        2    Para   2    Term   0       2    AB   0    Living   2       SAB   0    TAB   0    Ectopic   0    Multiple   0    Live Births   2               No LMP recorded  Patient has had an injection  Past Medical History:   Diagnosis Date    Abnormal Pap smear of cervix     Asthma     NO MEDS FOR PAST SEVERAL MONTHS    Disease of thyroid gland     High-risk pregnancy in second trimester     LA    16   R   16     HPV (human papilloma virus) infection     Migraine        Past Surgical History:   Procedure Laterality Date    ABDOMINOPLASTY      CERVICAL BIOPSY  W/ LOOP ELECTRODE EXCISION      LIPOSUCTION         Social History     No Known Allergies      Current Outpatient Medications:     albuterol (PROVENTIL HFA,VENTOLIN HFA) 90 mcg/act inhaler, Inhale 1 puff every 6 (six) hours as needed, Disp: , Rfl:     Calcium-Magnesium-Vitamin D (CALCIUM 1200+D3 PO), Take by mouth, Disp: , Rfl:     Ferrous Sulfate (Iron) 325 (65 Fe) MG TABS, Take by mouth, Disp: , Rfl:     levothyroxine 88 mcg tablet, Take 100 mcg by mouth daily , Disp: , Rfl:     medroxyPROGESTERone (DEPO-PROVERA) 150 mg/mL injection, INJECT EVERY 12 WEEKS AS DIRECTED, Disp: , Rfl:     multivitamin (THERAGRAN) TABS, Take 1 tablet by mouth daily, Disp: , Rfl:     Potassium Gluconate (HM Potassium) 595 (99 K) MG TABS, Take by mouth, Disp: , Rfl:     cyclobenzaprine (FLEXERIL) 5 mg tablet, Take 1 tablet (5 mg total) by mouth 3 (three) times a day as needed for muscle spasms for up to 4 days, Disp: 12 tablet, Rfl: 0    lidocaine (LIDODERM) 5 %, Apply 1 patch topically every 24 hours for 15 doses Remove & Discard patch within 12 hours or as directed by MD, Disp: 7 patch, Rfl: 0    misoprostol (CYTOTEC) 200 mcg tablet, Take 1 tablet (200 mcg total) by mouth daily for 2 days Prior to procedure, Disp: 2 tablet, Rfl: 0    naproxen (NAPROSYN) 250 mg tablet, Take 1 tablet (250 mg total) by mouth 3 (three) times a day with meals for 7 days, Disp: 21 tablet, Rfl: 0      Review of Systems   Constitutional: Negative  HENT: Negative  Eyes: Negative  Respiratory: Negative  Cardiovascular: Negative  Gastrointestinal: Negative  Endocrine: Negative  Genitourinary:        As noted in HPI   Musculoskeletal: Negative  Skin: Negative  Allergic/Immunologic: Negative  Neurological: Negative  Hematological: Negative  Psychiatric/Behavioral: Negative          BP 98/60 (BP Location: Right arm, Patient Position: Sitting, Cuff Size: Standard)   Pulse 68   Temp 98 2 °F (36 8 °C) (Temporal)   Ht 5' 2" (1 575 m)   Wt 73 1 kg (161 lb 3 2 oz)   BMI 29 48 kg/m²     Physical Exam  Constitutional:       General: She is not in acute distress  Appearance: She is well-developed  Abdominal:      Palpations: Abdomen is soft  Tenderness: There is no abdominal tenderness  There is no guarding  Neurological:      Mental Status: She is alert and oriented to person, place, and time  Skin:     General: Skin is warm and dry  Psychiatric:         Behavior: Behavior normal            The remainder of her physical examination was deferred as she was here today for consultation and discussion

## 2021-01-04 NOTE — PATIENT INSTRUCTIONS
Levonorgestrel (Into the uterus)   Levonorgestrel ([id number])  Prevents pregnancy and treats heavy menstrual bleeding  This is an intrauterine device (IUD), which is a reversible form of birth control  This IUD slowly releases levonorgestrel, a hormone  Brand Name(s): Norijosy Ayon, Mirena, [place]   There may be other brand names for this medicine  When This Medicine Should Not Be Used: This device is not right for everyone  Do not use it if you had an allergic reaction to levonorgestrel, silicone, polyethylene, silica, barium sulfate or iron oxide, or if you are pregnant  How to Use This Medicine:   Device  · A nurse or other trained health professional will give you this medicine  · The IUD is usually inserted by your doctor during your monthly period  You will need to see your doctor 4 to 6 weeks after the IUD is placed and then once a year  · Your IUD has a string or "tail" that is made of plastic thread  About one or two inches of this string hangs into your vagina  You cannot see this string, and it will not cause problems when you have sex  Check your IUD after each monthly period  You may not be protected against pregnancy if you cannot feel the string or if you feel plastic  Do the following to check the placement of your IUD:  ? Wash your hands with soap and warm water  Dry them with a clean towel  ? Bend your knees and squat low to the ground  ? Gently put your index finger high inside your vagina  The cervix is at the top of the vagina  Find the IUD string coming from your cervix  Never pull on the string  You should not be able to feel the plastic of the IUD itself  Wash your hands after you are done checking your IUD string  · Your doctor will need to remove your IUD after 3 years for [place], after 5 years for [facility], or after 6 years for [place] or Mirena®  You will also need to have it replaced if it comes out of your uterus   If you are using Mirena® or Liletta® and want to stop, your doctor can remove it at any time  However, you may become pregnant as soon as Nancey Footman, Mirena®, or Verline Grounds is removed or if you have intercourse the week before Nile Macadam is removed  Use another form of birth control or have a new IUD inserted to keep from getting pregnant  Drugs and Foods to Avoid:   Ask your doctor or pharmacist before using any other medicine, including over-the-counter medicines, vitamins, and herbal products  · Some medicines can affect how this device works  Tell your doctor if you are using a blood thinner (including warfarin)  Warnings While Using This Medicine:   · Tell your doctor if you are breastfeeding, or if you had a baby, miscarriage, or  in the past 3 months  Tell your doctor if you have liver disease (including tumor or cancer), heart disease, breast cancer, heart or blood circulation problems, migraine, high blood pressure, or a history of heart valve problems, blood clotting problems, stroke, or heart attack  Tell your doctor if you have problems with your immune system or have had surgery on your female organs (especially fallopian tubes)  · Tell your doctor if you have had any problems, infections, or other conditions that affected your reproductive system  There are many problems that could make an IUD a bad choice for you, including if you have fibroids, unexplained bleeding, a uterus that has an unusual shape, a recent infection, a history of pelvic inflammatory disease, an abnormal Pap test, ectopic pregnancy, cancer or suspected cancer, or an existing IUD  · There is a small chance that you could get pregnant when using an IUD, just as there is with any birth control  If you get pregnant, your doctor may remove your IUD to lower the risk of miscarriage or other problems  · This medicine may cause the following problems:  ? Increased risk of ectopic pregnancy (pregnancy outside the uterus)  ?  Increased risk of serious infections, including sepsis  ? Increased risk of pelvic inflammatory disease (PID) or endometritis  ? Perforation (hole in the wall of your uterus), which can damage other organs  ? Increased risk for ovarian cysts  ? Increased risk of cancer of the breast, uterus, or cervix  ? Increased risk of high blood pressure, stroke, heart attack, or clotting problems  ? Jaundice (yellow skin or eyes)  · You might have some spotting and cramping during the first weeks after the IUD has been inserted  These symptoms should decrease or go away within a few weeks up to 6 months  · You could have less bleeding or even stop having periods by the end of the first year  Call your doctor if you have a change from your regular bleeding pattern after you have had your IUD for awhile, including more bleeding or if you miss a period (and you were having periods even with your IUD)  · An IUD can slip partly or all the way out of your uterus  If this happens, use condoms or another form of birth control, and call your doctor right away  · This IUD will not protect you from HIV/AIDS or other sexually transmitted diseases  · If you have the [facility] or [facility], tell your doctor before you have an MRI test   · Your doctor will check your progress and the effects of this medicine at regular visits  Keep all appointments    Possible Side Effects While Using This Medicine:   Call your doctor right away if you notice any of these side effects:  · Allergic reaction: Itching or hives, swelling in your face or hands, swelling or tingling in your mouth or throat, chest tightness, trouble breathing  · Chest pain, problems with speech or walking, numbness or weakness in your arm or leg or on one side of your body  · Heavy bleeding from your vagina  · Pain during sex, or if your partner feels the hard plastic of the IUD during sex  · Severe headache, vision changes  · Stomach or pelvic pain, tenderness, or cramping that is sudden or severe  · Unusual bleeding, bruising, or weakness  · Vaginal discharge that has a bad smell, fever, chills, sores on your genitals  · Yellow skin or eyes  If you notice these less serious side effects, talk with your doctor:   · Acne or other skin changes  · Breast pain  · Change in bleeding pattern after the first few months  · Dizziness or lightheadedness after IUD is placed  · Mild itching around your vagina and genitals  · Weight gain  If you notice other side effects that you think are caused by this medicine, tell your doctor  Call your doctor for medical advice about side effects  You may report side effects to FDA at 3-478-SEO-5358  © Copyright [address] Information is for End User's use only and may not be sold, redistributed or otherwise used for commercial purposes  The above information is an  only  It is not intended as medical advice for individual conditions or treatments  Talk to your doctor, nurse or pharmacist before following any medical regimen to see if it is safe and effective for you

## 2021-01-06 ENCOUNTER — TELEPHONE (OUTPATIENT)
Dept: OBGYN CLINIC | Facility: CLINIC | Age: 31
End: 2021-01-06

## 2021-01-06 NOTE — TELEPHONE ENCOUNTER
Called Insurance @ [phone] Spoke to: Vance Luciano, states Mirena IUD is covered at 100% with NO AUTH needed  Patient already has appointment made

## 2021-01-11 ENCOUNTER — PROCEDURE VISIT (OUTPATIENT)
Dept: OBGYN CLINIC | Facility: CLINIC | Age: 31
End: 2021-01-11
Payer: COMMERCIAL

## 2021-01-11 VITALS
HEART RATE: 66 BPM | HEIGHT: 62 IN | DIASTOLIC BLOOD PRESSURE: 60 MMHG | WEIGHT: 157 LBS | BODY MASS INDEX: 28.89 KG/M2 | SYSTOLIC BLOOD PRESSURE: 102 MMHG | TEMPERATURE: 97.1 F

## 2021-01-11 DIAGNOSIS — Z30.430 ENCOUNTER FOR INSERTION OF MIRENA IUD: Primary | ICD-10-CM

## 2021-01-11 DIAGNOSIS — Z01.812 PRE-PROCEDURE LAB EXAM: ICD-10-CM

## 2021-01-11 LAB — SL AMB POCT URINE HCG: NEGATIVE

## 2021-01-11 PROCEDURE — 58300 INSERT INTRAUTERINE DEVICE: CPT | Performed by: OBSTETRICS & GYNECOLOGY

## 2021-01-11 PROCEDURE — 81025 URINE PREGNANCY TEST: CPT | Performed by: OBSTETRICS & GYNECOLOGY

## 2021-01-11 NOTE — PROGRESS NOTES
Iud insertions    Date/Time: 1/11/2021 9:29 AM  Performed by: Yesica Harris MD  Authorized by: Yesica Harris MD   Universal Protocol:  Consent: Verbal consent obtained  Written consent obtained  Patient understanding: patient states understanding of the procedure being performed  Patient consent: the patient's understanding of the procedure matches consent given  Procedure consent: procedure consent matches procedure scheduled  Relevant documents: relevant documents present and verified  Patient identity confirmed: verbally with patient        Procedure:     Pelvic exam performed: yes      Negative urine pregnancy test: yes      Cervix cleaned and prepped: yes      Speculum placed in vagina: yes      Tenaculum applied to cervix: Allis applied to cervix  Uterus sounded: yes      Uterus sound depth (cm):  7    IUD inserted with no complications: yes      IUD type:  Mirena    Strings trimmed: yes    Post-procedure:     Patient tolerated procedure well: yes      Patient will follow up after next period: yes    Comments:       Follow-up in 6 weeks for IUD check

## 2021-01-11 NOTE — PATIENT INSTRUCTIONS
Intrauterine Device   DISCHARGE INSTRUCTIONS:   Seek care immediately if:   · You have severe pain or bleeding during your period  · You have a fever and severe abdominal pain  Call your doctor or gynecologist if:   · You think you are pregnant  · The IUD has come out  · You have bleeding from your vagina after you have sex, and it is not your period  · You have pain during sex  · You cannot feel the IUD string, the string feels longer, or you feel the plastic of the IUD itself  · You have vaginal discharge that is green, yellow, or has a foul odor  · You have questions or concerns about your condition or care  Medicines:   · NSAIDs  help decrease swelling and pain or fever  This medicine is available with or without a doctor's order  NSAIDs can cause stomach bleeding or kidney problems in certain people  If you take blood thinner medicine, always ask your healthcare provider if NSAIDs are safe for you  Always read the medicine label and follow directions  · Take your medicine as directed  Contact your healthcare provider if you think your medicine is not helping or if you have side effects  Tell him or her if you are allergic to any medicine  Keep a list of the medicines, vitamins, and herbs you take  Include the amounts, and when and why you take them  Bring the list or the pill bottles to follow-up visits  Carry your medicine list with you in case of an emergency  Self-care:   · Apply heat to relieve pain and cramping  Use a heating pad set on low  Apply heat to your lower abdomen for 20 minutes every hour, or as directed  · Return to activities as directed  Your healthcare provider will tell you when it is okay to return to work, school, or other activities  · Do not use a tampon or have sex  until your provider says it is okay  Make sure your IUD is in place: An IUD has a string that is made of plastic thread  One to 2 inches of this string hangs into your vagina  You cannot see this string, and it should not cause problems when you have sex  Check your IUD string every 3 days for the first 3 months that you have your IUD  After that, check the string after each monthly period  Do the following to check the placement of your IUD:  · Wash your hands with soap and warm water  Dry them with a clean towel  · Bend your knees and squat low to the ground  · Gently put your index finger inside your vagina  The cervix is at the top of the vagina and feels like the tip of your nose  Feel for the IUD string  Do not pull on the string  You should not be able to feel the firm plastic of the IUD itself  · Wash your hands after you check your IUD string  For more information:   · [facility] [address] [place]  Phone: 1- 136 - [phone]  Web Address: https://[employer]    Follow up with your healthcare provider as directed:  Write down your questions so you remember to ask them during your visits  © Copyright [address] Information is for End User's use only and may not be sold, redistributed or otherwise used for commercial purposes  All illustrations and images included in CareNotes® are the copyrighted property of A D A M , Inc  or [facility] josephine   The above information is an  only  It is not intended as medical advice for individual conditions or treatments  Talk to your doctor, nurse or pharmacist before following any medical regimen to see if it is safe and effective for you

## 2021-02-23 NOTE — PROGRESS NOTES
Assessment/Plan:     Diagnoses and all orders for this visit:    IUD check up    Breakthrough bleeding with IUD    Other orders  -     topiramate (TOPAMAX) 25 mg tablet  -     levothyroxine 100 mcg tablet; Take 100 mcg by mouth daily  -     D3-50 1 25 MG (39394 UT) capsule        Patient with some breakthrough bleeding from IUD  Advised expectant management for now  Advised to continue to monitor bleeding  Follow-up in 3 months for annual exam/PAP due (normal in 3 years)  Irregular bleeding and cramping could occur within the 1st few months after Intrauterine device placement  Usually, by 6 months following insertion, most patients would report resolution of the symptoms and improvement in bleeding patterns  Patient's with continued bleeding after this period of time may need evaluation to rule malposition  Treatment options may include NSAIDs,  or addition of OCP    Subjective   Patient ID: Aaliyah Hess is a 26 y o  female  Patient is here for a follow-up  Chief Complaint   Patient presents with    Follow-up     string check (Mirena inserted 2021)  Patient with c/o of spotting daily and then got heavier - just stopped on   Patient is here for an Intrauterine device check  Patient declines any complaints today  She declines any abnormal uterine bleeding, pelvic pain or pain with intercourse  She declines any untoward side effects from the intrauterine devise  She has been spotting on and off, bleeding x 2x      Menstrual History:  OB History        2    Para   2    Term   0       2    AB   0    Living   2       SAB   0    TAB   0    Ectopic   0    Multiple   0    Live Births   2                  No LMP recorded  (Menstrual status: Birth Control)  Past Medical History:   Diagnosis Date    Abnormal Pap smear of cervix     Asthma     NO MEDS FOR PAST SEVERAL MONTHS    Disease of thyroid gland     High-risk pregnancy in second trimester     LA    16 R 4/6/16     HPV (human papilloma virus) infection     Migraine        Social History     Tobacco Use    Smoking status: Never Smoker    Smokeless tobacco: Never Used   Substance Use Topics    Alcohol use: Not Currently     Comment: social alcohol use per allscript     Drug use: No       No Known Allergies      Current Outpatient Medications:     albuterol (PROVENTIL HFA,VENTOLIN HFA) 90 mcg/act inhaler, Inhale 1 puff every 6 (six) hours as needed, Disp: , Rfl:     Calcium-Magnesium-Vitamin D (CALCIUM 1200+D3 PO), Take by mouth, Disp: , Rfl:     cyclobenzaprine (FLEXERIL) 5 mg tablet, Take 1 tablet (5 mg total) by mouth 3 (three) times a day as needed for muscle spasms for up to 4 days, Disp: 12 tablet, Rfl: 0    D3-50 1 25 MG (14336 UT) capsule, , Disp: , Rfl:     Ferrous Sulfate (Iron) 325 (65 Fe) MG TABS, Take by mouth, Disp: , Rfl:     levothyroxine 100 mcg tablet, Take 100 mcg by mouth daily, Disp: , Rfl:     lidocaine (LIDODERM) 5 %, Apply 1 patch topically every 24 hours for 15 doses Remove & Discard patch within 12 hours or as directed by MD, Disp: 7 patch, Rfl: 0    multivitamin (THERAGRAN) TABS, Take 1 tablet by mouth daily, Disp: , Rfl:     naproxen (NAPROSYN) 250 mg tablet, Take 1 tablet (250 mg total) by mouth 3 (three) times a day with meals for 7 days, Disp: 21 tablet, Rfl: 0    Potassium Gluconate (HM Potassium) 595 (99 K) MG TABS, Take by mouth, Disp: , Rfl:     topiramate (TOPAMAX) 25 mg tablet, , Disp: , Rfl:     levothyroxine 88 mcg tablet, Take 100 mcg by mouth daily , Disp: , Rfl:     misoprostol (CYTOTEC) 200 mcg tablet, Take 1 tablet (200 mcg total) by mouth daily for 2 days Prior to procedure (Patient not taking: Reported on 2/24/2021), Disp: 2 tablet, Rfl: 0      Review of Systems   Constitutional: Negative  HENT: Negative  Eyes: Negative  Respiratory: Negative  Cardiovascular: Negative  Gastrointestinal: Negative  Endocrine: Negative  Genitourinary:        As noted in HPI   Musculoskeletal: Negative  Skin: Negative  Allergic/Immunologic: Negative  Neurological: Negative  Hematological: Negative  Psychiatric/Behavioral: Negative  /60 (BP Location: Right arm, Patient Position: Sitting, Cuff Size: Adult)   Pulse 57   Temp 97 5 °F (36 4 °C) (Temporal)   Ht 5' 1" (1 549 m)   Wt 71 2 kg (157 lb)   BMI 29 66 kg/m²       Physical Exam  Constitutional:       General: She is not in acute distress  Appearance: She is well-developed  Genitourinary:      Pelvic exam was performed with patient in the lithotomy position  Inguinal canal, urethra, bladder, cervix, uterus, right adnexa and left adnexa normal       No vulval lesion, tenderness, ulcerations, Bartholin's cyst or rash noted  No signs of labial injury  No posterior fourchette tenderness, injury, rash or lesion present  No signs of injury or lesions in the vagina  Vaginal discharge present  No vaginal erythema, tenderness, bleeding, atrophy or prolapse  No cervical polyp  IUD strings visualized  Uterus is not enlarged or tender  No uterine mass detected  Uterus is regular  No right or left adnexal mass present  Right adnexa not tender or full  Left adnexa not tender or full  Genitourinary Comments: Brown discharge   Abdominal:      General: There is no distension  Palpations: Abdomen is soft  Tenderness: There is no abdominal tenderness  Neurological:      Mental Status: She is alert and oriented to person, place, and time  Skin:     General: Skin is warm and dry     Psychiatric:         Behavior: Behavior normal

## 2021-02-24 ENCOUNTER — OFFICE VISIT (OUTPATIENT)
Dept: OBGYN CLINIC | Facility: CLINIC | Age: 31
End: 2021-02-24
Payer: COMMERCIAL

## 2021-02-24 VITALS
HEART RATE: 57 BPM | SYSTOLIC BLOOD PRESSURE: 108 MMHG | HEIGHT: 61 IN | TEMPERATURE: 97.5 F | WEIGHT: 157 LBS | DIASTOLIC BLOOD PRESSURE: 60 MMHG | BODY MASS INDEX: 29.64 KG/M2

## 2021-02-24 DIAGNOSIS — Z30.431 IUD CHECK UP: Primary | ICD-10-CM

## 2021-02-24 DIAGNOSIS — Z97.5 BREAKTHROUGH BLEEDING WITH IUD: ICD-10-CM

## 2021-02-24 DIAGNOSIS — N92.1 BREAKTHROUGH BLEEDING WITH IUD: ICD-10-CM

## 2021-02-24 PROCEDURE — 99213 OFFICE O/P EST LOW 20 MIN: CPT | Performed by: OBSTETRICS & GYNECOLOGY

## 2021-02-24 RX ORDER — TOPIRAMATE 25 MG/1
TABLET ORAL
COMMUNITY
Start: 2021-01-08 | End: 2022-05-23 | Stop reason: ALTCHOICE

## 2021-02-24 RX ORDER — LEVOTHYROXINE SODIUM 0.1 MG/1
100 TABLET ORAL DAILY
COMMUNITY
Start: 2021-02-13 | End: 2022-02-13

## 2021-02-24 RX ORDER — METHOCARBAMOL 750 MG/1
TABLET ORAL
COMMUNITY
Start: 2020-12-03

## 2021-02-24 NOTE — PATIENT INSTRUCTIONS
Intrauterine Device   WHAT YOU NEED TO KNOW:   What is an intrauterine device (IUD)? An IUD is a type of birth control that is inserted into your uterus  It is a small, flexible piece of plastic with a string on the end  It is inserted and removed by your healthcare provider  IUDs prevent sperm from reaching or fertilizing an egg  IUDs also prevent a fertilized egg from attaching to the uterus and developing into a fetus  What are the most common types of IUDs? Your healthcare provider will recommend the type of IUD that is right for you  This is based on your age and if you have had a child  If you have not had a child, a smaller IUD will be used  · A copper IUD  slowly releases a small amount of copper into your uterus  This IUD can remain in place for up to 10 years  · A hormone-releasing IUD  slowly releases a small amount of progesterone into your uterus  Progesterone is a hormone that is made by your body to help control your periods  This IUD can remain in place for 3 to 5 years  What are the advantages of an IUD? · An IUD is 98% to 99% effective in preventing pregnancy  · The IUD can be removed by your healthcare provider if you decide to have a baby  You may be able to get pregnant as soon as the IUD is removed  · An IUD protects you from pregnancy right after it is inserted  · You do not have to stop sexual activity to insert it  You do not have to remember to take your birth control pill  · Copper IUDs are safer for some women than oral birth control pills  Examples include women who smoke or have a history of blood clots  · Hormone-releasing IUDs may decrease certain health problems  Examples include bleeding and cramping that happen with your monthly period  What are the disadvantages of an IUD? · There is a small chance that you could get pregnant  Sometimes the IUD cannot be removed after you get pregnant   This increases your risk of a miscarriage or an ectopic pregnancy  Ectopic pregnancy is when the fertilized egg starts to grow somewhere other than your uterus  · An IUD does not protect you from sexually transmitted infections  · You may have cramps during the first weeks after you get the IUD  · A copper IUD may cause your monthly period to be heavier or more painful  This is more common within the first 3 months after you get the IUD  You may need to have your IUD removed if your bleeding or pain becomes severe  You may have spotting between periods  · There is a small risk of an infection within the first 20 days after the IUD is placed  Infection can lead to pelvic inflammatory disease  This can cause infertility  · Your uterus may tear when the IUD is inserted  The IUD may slip part or all of the way out of your uterus  How is the IUD inserted? · The IUD is usually inserted during your monthly period  This may help decrease the amount of discomfort you have during the procedure  It also helps make sure that you are not pregnant  You will be asked to lie down and place your feet in stirrups  Your healthcare provider will gently insert a speculum into your vagina  This is the same tool used during a Pap smear  The speculum allows your healthcare provider to see inside your vagina to your cervix  The cervix is the opening of your uterus  · Your healthcare provider will clean your cervix with an antiseptic solution to prevent infection  You may be given numbing medicine  A long plastic tube is gently passed through your cervix and into your uterus  This tube has the IUD inside of it  The IUD is pushed out of the tube and into your uterus  You may have cramps as the IUD is inserted  The tube is removed after the IUD is in place  How can I make sure my IUD is still in place? An IUD has a string made of plastic thread  One to 2 inches of this string hangs into your vagina   You cannot see this string, and it should not cause problems when you have sex  Check your IUD string every 3 days for the first 3 months after it is inserted  After that, check the string after each monthly period  Do the following to check the placement of your IUD:  · Wash your hands with soap and warm water  Dry them with a clean towel  · Bend your knees and squat low to the ground  · Gently put your index finger inside your vagina  The cervix is at the top of the vagina and feels like the tip of your nose  Feel for the IUD string  Do not pull on the string  You should not be able to feel the firm plastic of the IUD itself  · Wash your hands after you check your IUD string  Where can I find more information? · [facility] [address] [place]  Phone: [phone]  Web Address: https://[employer]    When should I seek immediate care? · You have severe pain or bleeding during your period  · You have a fever and severe abdominal pain  When should I call my doctor? · You think you are pregnant  · The IUD has come out  · You have bleeding from your vagina after you have sex, and it is not your period  · You have pain during sex  · You cannot feel the IUD string, the string feels longer, or you feel the plastic of the IUD itself  · You have vaginal discharge that is green, yellow, or has a foul odor  · You have questions or concerns about your condition or care  CARE AGREEMENT:   You have the right to help plan your care  Learn about your health condition and how it may be treated  Discuss treatment options with your healthcare providers to decide what care you want to receive  You always have the right to refuse treatment  The above information is an  only  It is not intended as medical advice for individual conditions or treatments  Talk to your doctor, nurse or pharmacist before following any medical regimen to see if it is safe and effective for you    © Copyright [address] Information is for [employer] use only and may not be sold, redistributed or otherwise used for commercial purposes   All illustrations and images included in CareNotes® are the copyrighted property of A D A M , Inc  or [address] [place], [place] [address]db josephine

## 2021-03-31 DIAGNOSIS — Z23 ENCOUNTER FOR IMMUNIZATION: ICD-10-CM

## 2021-06-08 ENCOUNTER — ANNUAL EXAM (OUTPATIENT)
Dept: OBGYN CLINIC | Facility: CLINIC | Age: 31
End: 2021-06-08
Payer: COMMERCIAL

## 2021-06-08 VITALS
HEART RATE: 81 BPM | BODY MASS INDEX: 30.38 KG/M2 | SYSTOLIC BLOOD PRESSURE: 110 MMHG | TEMPERATURE: 97.5 F | WEIGHT: 160.8 LBS | DIASTOLIC BLOOD PRESSURE: 60 MMHG

## 2021-06-08 DIAGNOSIS — Z97.5 IUD CONTRACEPTION: ICD-10-CM

## 2021-06-08 DIAGNOSIS — Z12.4 CERVICAL CANCER SCREENING: ICD-10-CM

## 2021-06-08 DIAGNOSIS — Z01.419 ENCNTR FOR GYN EXAM (GENERAL) (ROUTINE) W/O ABN FINDINGS: Primary | ICD-10-CM

## 2021-06-08 PROCEDURE — 99395 PREV VISIT EST AGE 18-39: CPT | Performed by: OBSTETRICS & GYNECOLOGY

## 2021-06-08 PROCEDURE — 0503F POSTPARTUM CARE VISIT: CPT | Performed by: OBSTETRICS & GYNECOLOGY

## 2021-06-08 NOTE — PROGRESS NOTES
Assessment        Diagnoses and all orders for this visit:    Encntr for gyn exam (general) (routine) w/o abn findings    Cervical cancer screening  -     Liquid-based pap, screening    IUD contraception                  Plan      All questions answered  Await pap smear results  Contraception: IUD  Discussed healthy lifestyle modifications  Educational material distributed  Follow up in 1 year  Follow up as needed  Pap smear  Pt doing well with IUD    Angela Daley is a 26 y o  female who presents for annual exam       Chief Complaint   Patient presents with    Gynecologic Exam     Last pap 18 negative  Patient reports no problems     Patient is here for an Intrauterine device check  Patient declines any complaints today  She declines any abnormal uterine bleeding, pelvic pain or pain with intercourse  She declines any untoward side effects from the intrauterine devise  She had spotting and no period in early  She has been with  x 7 years      Last Pap: 18  Last mammogram: n/a        Current contraception: IUD  History of abnormal Pap smear: yes - h/o LEEP  History of abnormal mammogram: no  Family history of uterine or ovarian cancer: no  Family history of breast cancer: no  Family history of colon cancer: no        Menstrual History:  OB History        2    Para   2    Term   0       2    AB   0    Living   2       SAB   0    TAB   0    Ectopic   0    Multiple   0    Live Births   2               No LMP recorded  (Menstrual status: Birth Control)  Past Medical History:   Diagnosis Date    Abnormal Pap smear of cervix     Asthma     NO MEDS FOR PAST SEVERAL MONTHS    Disease of thyroid gland     High-risk pregnancy in second trimester     LA    16   R   16     HPV (human papilloma virus) infection     Migraine      Past Surgical History:   Procedure Laterality Date    ABDOMINOPLASTY      CERVICAL BIOPSY  W/ LOOP ELECTRODE EXCISION      LIPOSUCTION      LIPOSUCTION  04/27/2021     Family History   Problem Relation Age of Onset    Arthritis Mother     Hypertension Father         Essential / hypertension iwth unspecified goal     Arthritis Sister     Lupus Sister         systemic erythematosus     Brain cancer Maternal Grandfather        Social History     Tobacco Use    Smoking status: Never Smoker    Smokeless tobacco: Never Used   Substance Use Topics    Alcohol use: Not Currently     Comment: social alcohol use per allscript     Drug use: No          Current Outpatient Medications:     albuterol (PROVENTIL HFA,VENTOLIN HFA) 90 mcg/act inhaler, Inhale 1 puff every 6 (six) hours as needed, Disp: , Rfl:     Calcium-Magnesium-Vitamin D (CALCIUM 1200+D3 PO), Take by mouth, Disp: , Rfl:     D3-50 1 25 MG (37685 UT) capsule, , Disp: , Rfl:     Ferrous Sulfate (Iron) 325 (65 Fe) MG TABS, Take by mouth, Disp: , Rfl:     levothyroxine 100 mcg tablet, Take 100 mcg by mouth daily, Disp: , Rfl:     lidocaine (LIDODERM) 5 %, Apply 1 patch topically every 24 hours for 15 doses Remove & Discard patch within 12 hours or as directed by MD, Disp: 7 patch, Rfl: 0    multivitamin (THERAGRAN) TABS, Take 1 tablet by mouth daily, Disp: , Rfl:     Potassium Gluconate (HM Potassium) 595 (99 K) MG TABS, Take by mouth, Disp: , Rfl:     topiramate (TOPAMAX) 25 mg tablet, , Disp: , Rfl:     cyclobenzaprine (FLEXERIL) 5 mg tablet, Take 1 tablet (5 mg total) by mouth 3 (three) times a day as needed for muscle spasms for up to 4 days, Disp: 12 tablet, Rfl: 0    levothyroxine 88 mcg tablet, Take 100 mcg by mouth daily , Disp: , Rfl:     misoprostol (CYTOTEC) 200 mcg tablet, Take 1 tablet (200 mcg total) by mouth daily for 2 days Prior to procedure (Patient not taking: Reported on 2/24/2021), Disp: 2 tablet, Rfl: 0    naproxen (NAPROSYN) 250 mg tablet, Take 1 tablet (250 mg total) by mouth 3 (three) times a day with meals for 7 days, Disp: 21 tablet, Rfl: 0    No Known Allergies        Review of Systems   Constitutional: Negative  HENT: Negative  Eyes: Negative  Respiratory: Negative  Cardiovascular: Negative  Gastrointestinal: Negative  Endocrine: Negative  Genitourinary:        As noted in HPI   Musculoskeletal: Negative  Skin: Negative  Allergic/Immunologic: Negative  Neurological: Negative  Hematological: Negative  Psychiatric/Behavioral: Negative  /60 (BP Location: Right arm, Patient Position: Sitting, Cuff Size: Adult)   Pulse 81   Temp 97 5 °F (36 4 °C) (Temporal)   Wt 72 9 kg (160 lb 12 8 oz)   BMI 30 38 kg/m²         Physical Exam  Constitutional:       Appearance: She is well-developed  Genitourinary:      Pelvic exam was performed with patient in the lithotomy position  Vulva, urethra, bladder, vagina, uterus and rectum normal       No vulval condylomata, lesion, tenderness, Bartholin's cyst or rash noted  No signs of labial injury  No posterior fourchette tenderness, injury, rash or lesion present  No inguinal adenopathy present in the right or left side  No lesions in the vagina  No vaginal discharge, tenderness, bleeding, atrophy or prolapse  No cervical motion tenderness, friability, lesion or polyp  IUD strings visualized  Uterus is not enlarged or tender  No right or left adnexal mass present  Right adnexa not tender or full  Left adnexa not tender or full  Genitourinary Comments:      Rectum:      No external hemorrhoid  HENT:      Head: Normocephalic  Nose: Nose normal    Eyes:      Conjunctiva/sclera: Conjunctivae normal    Neck:      Musculoskeletal: Neck supple  No muscular tenderness  Thyroid: No thyromegaly  Cardiovascular:      Rate and Rhythm: Normal rate and regular rhythm  Heart sounds: Normal heart sounds  No murmur     Pulmonary:      Effort: Pulmonary effort is normal  No respiratory distress  Breath sounds: Normal breath sounds  No wheezing or rales  Chest:      Breasts:         Right: No mass, nipple discharge, skin change or tenderness  Left: No mass, nipple discharge, skin change or tenderness  Abdominal:      General: There is no distension  Palpations: Abdomen is soft  There is no mass  Tenderness: There is no abdominal tenderness  There is no guarding or rebound  Musculoskeletal:         General: No tenderness  Lymphadenopathy:      Cervical: No cervical adenopathy  Lower Body: No right inguinal adenopathy  No left inguinal adenopathy  Neurological:      Mental Status: She is alert and oriented to person, place, and time  Skin:     General: Skin is warm and dry     Psychiatric:         Mood and Affect: Mood normal          Behavior: Behavior normal

## 2021-06-08 NOTE — PATIENT INSTRUCTIONS

## 2021-06-09 PROCEDURE — G0145 SCR C/V CYTO,THINLAYER,RESCR: HCPCS | Performed by: OBSTETRICS & GYNECOLOGY

## 2021-06-09 PROCEDURE — 87624 HPV HI-RISK TYP POOLED RSLT: CPT | Performed by: OBSTETRICS & GYNECOLOGY

## 2021-06-14 LAB
LAB AP GYN PRIMARY INTERPRETATION: NORMAL
Lab: NORMAL

## 2021-07-20 ENCOUNTER — OFFICE VISIT (OUTPATIENT)
Dept: OBGYN CLINIC | Facility: CLINIC | Age: 31
End: 2021-07-20
Payer: COMMERCIAL

## 2021-07-20 VITALS
WEIGHT: 159 LBS | BODY MASS INDEX: 30.02 KG/M2 | TEMPERATURE: 97.3 F | DIASTOLIC BLOOD PRESSURE: 60 MMHG | SYSTOLIC BLOOD PRESSURE: 114 MMHG | HEART RATE: 75 BPM | HEIGHT: 61 IN

## 2021-07-20 DIAGNOSIS — Z30.42 ENCOUNTER FOR DEPO-PROVERA CONTRACEPTION: ICD-10-CM

## 2021-07-20 DIAGNOSIS — Z30.432 ENCOUNTER FOR IUD REMOVAL: Primary | ICD-10-CM

## 2021-07-20 DIAGNOSIS — T83.84XA PAIN DUE TO INTRAUTERINE CONTRACEPTIVE DEVICE (IUD), INITIAL ENCOUNTER (HCC): ICD-10-CM

## 2021-07-20 DIAGNOSIS — N92.1 BREAKTHROUGH BLEEDING WITH IUD: ICD-10-CM

## 2021-07-20 DIAGNOSIS — Z97.5 BREAKTHROUGH BLEEDING WITH IUD: ICD-10-CM

## 2021-07-20 PROCEDURE — 99214 OFFICE O/P EST MOD 30 MIN: CPT | Performed by: OBSTETRICS & GYNECOLOGY

## 2021-07-20 PROCEDURE — 58301 REMOVE INTRAUTERINE DEVICE: CPT | Performed by: OBSTETRICS & GYNECOLOGY

## 2021-07-20 RX ORDER — MEDROXYPROGESTERONE ACETATE 150 MG/ML
150 INJECTION, SUSPENSION INTRAMUSCULAR
Qty: 1 ML | Refills: 3 | Status: SHIPPED | OUTPATIENT
Start: 2021-07-20 | End: 2021-10-11 | Stop reason: SDUPTHER

## 2021-07-20 RX ORDER — MEDROXYPROGESTERONE ACETATE 150 MG/ML
150 INJECTION, SUSPENSION INTRAMUSCULAR ONCE
Status: COMPLETED | OUTPATIENT
Start: 2021-07-20 | End: 2021-07-20

## 2021-07-20 RX ADMIN — MEDROXYPROGESTERONE ACETATE 150 MG: 150 INJECTION, SUSPENSION INTRAMUSCULAR at 11:16

## 2021-07-20 NOTE — PROGRESS NOTES
Assessment/Plan:     Diagnoses and all orders for this visit:    Encounter for IUD removal    Breakthrough bleeding with IUD    Encounter for Depo-Provera contraception  -     medroxyPROGESTERone (DEPO-PROVERA) 150 mg/mL injection; Inject 1 mL (150 mg total) into a muscle every 3 (three) months  -     medroxyPROGESTERone (DEPO-PROVERA) IM injection 150 mg    Pain due to intrauterine contraceptive device (IUD), initial encounter ([facility] )              Irregular bleeding and cramping could occur within the 1st few months after Intrauterine device placement  Usually, by 6 months following insertion, most patients would report resolution of the symptoms and improvement in bleeding patterns  Patient's with continued bleeding after this period of time may need evaluation to rule malposition, expulsion, pregnancy, infection, and cervical dysplasia   Treatment options may include NSAIDs,  or addition of OCP  Medical treatment can be offered to women who continue to have bothersome bleeding up to 3 months from IUD insertion,  Ibuprofen, 600 mg every 6 hours for 5 days may be used  Another alternative is to use low-dose combined hormonal contraceptive pill  Patient offered expectant management also full evaluation including pelvic ultrasound to check Intrauterine device location but she declines  She reports daily bleeding, discharge, pelvic pain and pain with intercourse  She wishes Intrauterine device removal today  Intrauterine device was removed without difficulty  Discussed with patient contraceptive options  Patient previously did well on Depo-Provera  She was more concerned about prolonged Depo-Provera use and bone health  She was advised extra calcium and vitamin-D  Depo-Provera was initiated today  Follow-up in 3 months for next Depo-Provera injection      Subjective   Patient ID: Gilberto Johnson is a 26 y o  female  Patient is here for a problem visit      Chief Complaint   Patient presents with  Procedure     IUD removal and discuss DEPO  Patient states she has continued abnormal bleeding with IUD      patient reports bleeding since Intrauterine device was placed  Patient reports abnormal vaginal discharge, pelvic pain and pain with intercourse  Patient states that her bleeding has never resolved since insertion  Patient wishes to restart Depo-Provera    Menstrual History:  OB History        2    Para   2    Term   0       2    AB   0    Living   2       SAB   0    TAB   0    Ectopic   0    Multiple   0    Live Births   2                  No LMP recorded  (Menstrual status: Birth Control)  Past Medical History:   Diagnosis Date    Abnormal Pap smear of cervix     Asthma     NO MEDS FOR PAST SEVERAL MONTHS    Disease of thyroid gland     High-risk pregnancy in second trimester     LA    16   R   16     HPV (human papilloma virus) infection     Migraine        Past Surgical History:   Procedure Laterality Date    ABDOMINOPLASTY      CERVICAL BIOPSY  W/ LOOP ELECTRODE EXCISION      LIPOSUCTION      LIPOSUCTION  2021       Social History     Tobacco Use    Smoking status: Never Smoker    Smokeless tobacco: Never Used   Vaping Use    Vaping Use: Never used   Substance Use Topics    Alcohol use: Not Currently     Comment: social alcohol use per allscript     Drug use: No        No Known Allergies      Current Outpatient Medications:     albuterol (PROVENTIL HFA,VENTOLIN HFA) 90 mcg/act inhaler, Inhale 1 puff every 6 (six) hours as needed, Disp: , Rfl:     Calcium-Magnesium-Vitamin D (CALCIUM 1200+D3 PO), Take by mouth, Disp: , Rfl:     cyclobenzaprine (FLEXERIL) 5 mg tablet, Take 1 tablet (5 mg total) by mouth 3 (three) times a day as needed for muscle spasms for up to 4 days, Disp: 12 tablet, Rfl: 0    D3-50 1 25 MG (10092 UT) capsule, , Disp: , Rfl:     Ferrous Sulfate (Iron) 325 (65 Fe) MG TABS, Take by mouth, Disp: , Rfl:     levothyroxine 100 mcg tablet, Take 100 mcg by mouth daily, Disp: , Rfl:     lidocaine (LIDODERM) 5 %, Apply 1 patch topically every 24 hours for 15 doses Remove & Discard patch within 12 hours or as directed by MD, Disp: 7 patch, Rfl: 0    multivitamin (THERAGRAN) TABS, Take 1 tablet by mouth daily, Disp: , Rfl:     naproxen (NAPROSYN) 250 mg tablet, Take 1 tablet (250 mg total) by mouth 3 (three) times a day with meals for 7 days, Disp: 21 tablet, Rfl: 0    Potassium Gluconate (HM Potassium) 595 (99 K) MG TABS, Take by mouth, Disp: , Rfl:     topiramate (TOPAMAX) 25 mg tablet, , Disp: , Rfl:     medroxyPROGESTERone (DEPO-PROVERA) 150 mg/mL injection, Inject 1 mL (150 mg total) into a muscle every 3 (three) months, Disp: 1 mL, Rfl: 3      Review of Systems   Constitutional: Negative  HENT: Negative  Eyes: Negative  Respiratory: Negative  Cardiovascular: Negative  Gastrointestinal: Negative  Endocrine: Negative  Genitourinary:        As noted in HPI   Musculoskeletal: Negative  Skin: Negative  Allergic/Immunologic: Negative  Neurological: Negative  Hematological: Negative  Psychiatric/Behavioral: Negative  /60 (BP Location: Right arm, Patient Position: Sitting, Cuff Size: Adult)   Pulse 75   Temp (!) 97 3 °F (36 3 °C) (Temporal)   Ht 5' 1" (1 549 m)   Wt 72 1 kg (159 lb)   BMI 30 04 kg/m²       Physical Exam  Constitutional:       General: She is not in acute distress  Appearance: She is well-developed  Genitourinary:      Pelvic exam was performed with patient in the lithotomy position  Inguinal canal, urethra, bladder, cervix, uterus, right adnexa and left adnexa normal       No vulval lesion, tenderness, ulcerations, Bartholin's cyst or rash noted  No signs of labial injury  No posterior fourchette tenderness, injury, rash or lesion present  No signs of injury or lesions in the vagina        No vaginal discharge, erythema, tenderness, bleeding, atrophy or prolapse  No cervical polyp  IUD strings visualized  Uterus is not enlarged or tender  No uterine mass detected  Uterus is regular  No right or left adnexal mass present  Right adnexa not tender or full  Left adnexa not tender or full  Abdominal:      General: There is no distension  Palpations: Abdomen is soft  Tenderness: There is no abdominal tenderness  Neurological:      Mental Status: She is alert and oriented to person, place, and time  Skin:     General: Skin is warm and dry     Psychiatric:         Behavior: Behavior normal

## 2021-07-20 NOTE — PROGRESS NOTES
Iud removal    Date/Time: 7/20/2021 11:21 AM  Performed by: Markell Arroyo MD  Authorized by: Markell Arroyo MD   Universal Protocol:  Patient understanding: patient states understanding of the procedure being performed  Patient consent: the patient's understanding of the procedure matches consent given  Procedure consent: procedure consent matches procedure scheduled  Relevant documents: relevant documents present and verified  Test results: test results available and properly labeled    Procedure:     Removed with no complications: yes      Removal due to mechanical complications of IUD: yes    Comments:      IUD strings visualized,  Grasped with ring forceps, removed without difficulty and intact and discarded

## 2021-10-11 ENCOUNTER — OFFICE VISIT (OUTPATIENT)
Dept: OBGYN CLINIC | Facility: CLINIC | Age: 31
End: 2021-10-11
Payer: COMMERCIAL

## 2021-10-11 VITALS — HEART RATE: 70 BPM | SYSTOLIC BLOOD PRESSURE: 110 MMHG | DIASTOLIC BLOOD PRESSURE: 70 MMHG

## 2021-10-11 DIAGNOSIS — Z30.42 ENCOUNTER FOR DEPO-PROVERA CONTRACEPTION: ICD-10-CM

## 2021-10-11 DIAGNOSIS — Z30.42 DEPOT CONTRACEPTION: Primary | ICD-10-CM

## 2021-10-11 PROCEDURE — 96372 THER/PROPH/DIAG INJ SC/IM: CPT | Performed by: OBSTETRICS & GYNECOLOGY

## 2021-10-11 PROCEDURE — 99211 OFF/OP EST MAY X REQ PHY/QHP: CPT | Performed by: OBSTETRICS & GYNECOLOGY

## 2021-10-11 RX ORDER — MEDROXYPROGESTERONE ACETATE 150 MG/ML
150 INJECTION, SUSPENSION INTRAMUSCULAR ONCE
Status: COMPLETED | OUTPATIENT
Start: 2021-10-11 | End: 2021-10-11

## 2021-10-11 RX ORDER — MEDROXYPROGESTERONE ACETATE 150 MG/ML
150 INJECTION, SUSPENSION INTRAMUSCULAR
Qty: 1 ML | Refills: 3 | Status: SHIPPED | OUTPATIENT
Start: 2021-10-11 | End: 2022-05-23 | Stop reason: ALTCHOICE

## 2021-10-11 RX ADMIN — MEDROXYPROGESTERONE ACETATE 150 MG: 150 INJECTION, SUSPENSION INTRAMUSCULAR at 13:38

## 2021-11-24 NOTE — MISCELLANEOUS
C/O cold symptoms  X2days. Reports productive green phlegm and runny nose/congestion. States her grandson tested positive for influenza. Adds left hand pain, with swelling and no injury. Stable gait, A&Ox3.   Message  pt called stating that she was having really bad lower back pain and right leg pain  Asked pt if she was having any leaking of fluid pt states she was having something but thinks it was urine due to pressure  Pt states that she can not walk due to the pain  Notified Dr Juvencio Yen of pt symptoms, Dr Juvencio Yen would like pt to go to [facility] L&D  Notified pt, pt was not happy that she had to go to hospital  Notified pt that this is Dr Juvencio Yen recommendations so she can just be checked out to see if everything was okay pt stated okay she will head up to L&D  Notified L&D      Active Problems   1  Abnormal placenta affecting management of mother in second trimester (656 73)   (O43 92)  2  Anxiety (300 00) (F41 9)  3  ASCUS of cervix with negative high risk HPV (795 01) (R87 610)  4  Chronic migraine without aura with status migrainosus, not intractable (346 72)   (G43 701)  5  Diet controlled gestational diabetes mellitus (GDM) in second trimester (648 83)   (O24 410)  6  GBS bacteriuria (599 0,041 02) (N39 0,B95 1)  7  Hyperthyroidism (242 90) (E05 90)  8  Hypothyroidism (244 9) (E03 9)  9  Hypothyroidism complicating pregnancy, second trimester (648 13,244 9)   (O99 282,E03 9)  10  Insulin controlled gestational diabetes mellitus (GDM) in second trimester (648 83)    (O24 414)  11  Low back pain during pregnancy in third trimester (646 80,724 2) (O26 93,M54 5)  12  Maternal obesity, antepartum, second trimester (649 13,278 00) (O99 212,E66 9)  13  Normal pregnancy, unspecified trimester (V22 1) (Z34 90)  14  Third trimester pregnancy (V22 2) (Z33 1)  15  Uncomplicated asthma, unspecified asthma severity (493 90) (J45 909)  16  Yeast infection of the vagina (112 1) (B37 3)    Current Meds  1  Accu-Chek FastClix Lancets Miscellaneous; 4 times a day as directed; Therapy: 83Yqm7003 to (Evaluate:08Mbb1109)  Requested for: 18Uyi3033; Last   Rx:[id number] Ordered  2   Accu-Chek SmartView In Vitro Strip; TEST 4 TIMES DAILY; Therapy: 49Msk3189 to (Gabby Pear)  Requested for: 25Apr2016; Last   Rx:[id number] Ordered  3  Apidra SoloStar 100 UNIT/ML Subcutaneous Solution Pen-injector; 4 units before dinner   qd, titrate weekly as needed; Therapy: 51GTK9469 to (Evaluate:67Wli1309)  Requested for: 75QHV8137; Last   Rx:[id number] Ordered  4  BD Pen Needle Short U/F 31G X 8 MM Miscellaneous; USE AS DIRECTED  1 per   injection  Takes 6 injections daily; Therapy: 91MWN4922 to (Evaluate:16Xtv6378)  Requested for: 66MRI4692; Last   Rx:[id number] Ordered  5  Lantus SoloStar 100 UNIT/ML Subcutaneous Solution Pen-injector; Inject 20 units   Lantus at bedtime, titrate as directed; Therapy: 40NKY0732 to (Evaluate:05Imo4706)  Requested for: 45HMR7030; Last   Rx:[id number] Ordered  6  Prenatal 1+1 TABS; Therapy: (Recorded:20Apr2016) to Recorded  7  Synthroid 75 MCG Oral Tablet (Levothyroxine Sodium); Therapy: (Recorded:67Ysv8477) to Recorded  8  Tylenol with Codeine #3 300-30 MG Oral Tablet (Acetaminophen-Codeine); TAKE 1 TO 2   TABLETS EVERY  6 HOURS AS NEEDED FOR PAIN;   Therapy: 03WHU6803 to (Evaluate:18Wxs0484); Last Rx:[id number] Ordered    Allergies   1  No Known Drug Allergies   2   Seasonal    Signatures   Electronically signed by : Daniel Verdin, ; Jul 26 2016  2:20PM EST                       (Author)

## 2022-03-22 ENCOUNTER — HOSPITAL ENCOUNTER (EMERGENCY)
Facility: HOSPITAL | Age: 32
Discharge: HOME | End: 2022-03-22
Attending: EMERGENCY MEDICINE | Admitting: EMERGENCY MEDICINE
Payer: COMMERCIAL

## 2022-03-22 VITALS
RESPIRATION RATE: 18 BRPM | TEMPERATURE: 98.6 F | WEIGHT: 164.02 LBS | SYSTOLIC BLOOD PRESSURE: 108 MMHG | BODY MASS INDEX: 30.99 KG/M2 | HEART RATE: 84 BPM | DIASTOLIC BLOOD PRESSURE: 77 MMHG | OXYGEN SATURATION: 98 %

## 2022-03-22 DIAGNOSIS — J11.1 INFLUENZA: ICD-10-CM

## 2022-03-22 DIAGNOSIS — R04.0 LEFT-SIDED EPISTAXIS: Primary | ICD-10-CM

## 2022-03-22 PROCEDURE — 99284 EMERGENCY DEPT VISIT MOD MDM: CPT | Performed by: PHYSICIAN ASSISTANT

## 2022-03-22 PROCEDURE — 99283 EMERGENCY DEPT VISIT LOW MDM: CPT

## 2022-03-22 PROCEDURE — 30901 CONTROL OF NOSEBLEED: CPT | Performed by: PHYSICIAN ASSISTANT

## 2022-03-22 RX ORDER — OXYMETAZOLINE HYDROCHLORIDE 0.05 G/100ML
2 SPRAY NASAL ONCE
Status: COMPLETED | OUTPATIENT
Start: 2022-03-22 | End: 2022-03-22

## 2022-03-22 RX ORDER — SODIUM CHLORIDE/ALOE VERA
SPRAY, NON-AEROSOL (ML) NASAL
Qty: 22 ML | Refills: 0 | Status: SHIPPED | OUTPATIENT
Start: 2022-03-22 | End: 2022-07-11

## 2022-03-22 RX ORDER — ACETAMINOPHEN 325 MG/1
975 TABLET ORAL ONCE AS NEEDED
Status: COMPLETED | OUTPATIENT
Start: 2022-03-22 | End: 2022-03-22

## 2022-03-22 RX ADMIN — OXYMETAZOLINE HYDROCHLORIDE 2 SPRAY: 0.05 SPRAY NASAL at 22:46

## 2022-03-22 RX ADMIN — ACETAMINOPHEN 325MG 975 MG: 325 TABLET ORAL at 22:45

## 2022-03-23 NOTE — ED PROVIDER NOTES
History  Chief Complaint   Patient presents with   [address]      Patient reports that her son has influenza and she started with nasal congestion and flu-like symptoms yesterday, reports that this morning she started having an atraumatic nose bleed out of left nostril, reports she has been putting pressure on her nose all day but it continues to bleed  Reports headache  Pt presents to the ED with epistaxis from left nostril  Son dx with influenza recently  She started with URI symptoms yesterday - congestion and HA  Today has been having bleeding from left nostril int  Using Vaseline to nostril with out relief  Prior to Admission Medications   Prescriptions Last Dose Informant Patient Reported? Taking?    Calcium-Magnesium-Vitamin D (CALCIUM 1200+D3 PO)   Yes No   Sig: Take by mouth   D3-50 1 25 MG (11408 UT) capsule   Yes No   Ferrous Sulfate (Iron) 325 (65 Fe) MG TABS   Yes No   Sig: Take by mouth   Potassium Gluconate (HM Potassium) 595 (99 K) MG TABS   Yes No   Sig: Take by mouth   cyclobenzaprine (FLEXERIL) 5 mg tablet   No No   Sig: Take 1 tablet (5 mg total) by mouth 3 (three) times a day as needed for muscle spasms for up to 4 days   lidocaine (LIDODERM) 5 %   No No   Sig: Apply 1 patch topically every 24 hours for 15 doses Remove & Discard patch within 12 hours or as directed by MD   medroxyPROGESTERone (DEPO-PROVERA) 150 mg/mL injection   No No   Sig: Inject 1 mL (150 mg total) into a muscle every 3 (three) months   multivitamin (THERAGRAN) TABS   Yes No   Sig: Take 1 tablet by mouth daily   naproxen (NAPROSYN) 250 mg tablet   No No   Sig: Take 1 tablet (250 mg total) by mouth 3 (three) times a day with meals for 7 days   topiramate (TOPAMAX) 25 mg tablet   Yes No      Facility-Administered Medications: None       Past Medical History:   Diagnosis Date    Abnormal Pap smear of cervix     Asthma     NO MEDS FOR PAST SEVERAL MONTHS    Disease of thyroid gland     High-risk pregnancy in second trimester     LA    4/1/16   R   4/6/16     HPV (human papilloma virus) infection     Migraine        Past Surgical History:   Procedure Laterality Date    ABDOMINOPLASTY      CERVICAL BIOPSY  W/ LOOP ELECTRODE EXCISION      LIPOSUCTION      LIPOSUCTION  04/27/2021       Family History   Problem Relation Age of Onset    Arthritis Mother     Hypertension Father         Essential / hypertension iwth unspecified goal     Arthritis Sister     Lupus Sister         systemic erythematosus     Brain cancer Maternal Grandfather      I have reviewed and agree with the history as documented  E-Cigarette/Vaping    E-Cigarette Use Never User      E-Cigarette/Vaping Substances    Nicotine No     THC No     CBD No     Flavoring No     Other No     Unknown No      Social History     Tobacco Use    Smoking status: Never Smoker    Smokeless tobacco: Never Used   Vaping Use    Vaping Use: Never used   Substance Use Topics    Alcohol use: Not Currently     Comment: social alcohol use per allscript     Drug use: No       Review of Systems   Constitutional: Positive for chills and fever  HENT: Positive for congestion, postnasal drip and rhinorrhea  Negative for ear pain  Respiratory: Negative  Cardiovascular: Negative  Gastrointestinal: Negative  Physical Exam  Physical Exam  Vitals and nursing note reviewed  Constitutional:       Appearance: She is well-developed  HENT:      Head: Normocephalic and atraumatic  Right Ear: External ear normal       Left Ear: External ear normal       Nose:      Left Nostril: Epistaxis present  No foreign body or septal hematoma  Eyes:      Conjunctiva/sclera: Conjunctivae normal    Cardiovascular:      Rate and Rhythm: Normal rate and regular rhythm  Heart sounds: Normal heart sounds  Pulmonary:      Effort: Pulmonary effort is normal       Breath sounds: Normal breath sounds     Abdominal:      General: Bowel sounds are normal  Palpations: Abdomen is soft  Musculoskeletal:         General: Normal range of motion  Cervical back: Neck supple  Lymphadenopathy:      Cervical: No cervical adenopathy  Skin:     General: Skin is warm  Findings: No rash  Neurological:      Mental Status: She is alert  Psychiatric:         Behavior: Behavior normal          Vital Signs  ED Triage Vitals   Temperature Pulse Respirations Blood Pressure SpO2   03/22/22 2210 03/22/22 2210 03/22/22 2210 03/22/22 2210 03/22/22 2210   98 6 °F (37 °C) 84 18 108/77 98 %      Temp Source Heart Rate Source Patient Position - Orthostatic VS BP Location FiO2 (%)   03/22/22 2210 03/22/22 2210 03/22/22 2210 03/22/22 2210 --   Oral Monitor Sitting Right arm       Pain Score       03/22/22 2207       10 - Worst Possible Pain           Vitals:    03/22/22 2210   BP: 108/77   Pulse: 84   Patient Position - Orthostatic VS: Sitting         Visual Acuity      ED Medications  Medications   oxymetazoline (AFRIN) 0 05 % nasal spray 2 spray (2 sprays Each Nare Given 3/22/22 2246)   acetaminophen (TYLENOL) tablet 975 mg (975 mg Oral Given 3/22/22 2245)       Diagnostic Studies  Results Reviewed     None                 No orders to display              Procedures  Epistaxis management    Date/Time: 3/22/2022 10:50 PM  Performed by: Jory Flores PA-C  Authorized by: Jory Flores PA-C   [place] Protocol:  Consent given by: patient  Patient identity confirmed: verbally with patient      Patient location:  ED  Procedure details:     Treatment site:  L anterior    Hemostasis method:  Gel foam    Treatment episode: initial    Post-procedure details:     Assessment:  Bleeding stopped    Patient tolerance of procedure:   Tolerated well, no immediate complications  Comments:      Afrin              ED Course                                             MDM  Number of Diagnoses or Management Options  Influenza: new and does not require workup  Left-sided epistaxis: new and does not require workup  Risk of Complications, Morbidity, and/or Mortality  General comments: Bleeding stopped, instructions reviewed with pt  Patient Progress  Patient progress: improved      Disposition  Final diagnoses:   Left-sided epistaxis   Influenza     Time reflects when diagnosis was documented in both MDM as applicable and the Disposition within this note     Time User Action Codes Description Comment    3/22/2022 10:50 PM Bere Alvarado [R04 0] Left-sided epistaxis     3/22/2022 10:52 PM Bere Alvarado [J11 1] Influenza       ED Disposition     ED Disposition Condition Date/Time Comment    Discharge Stable Tue Mar 22, 2022 10:50 PM Vandana Colladoar discharge to home/self care  Follow-up Information     Follow up With Specialties Details Why Contact Info    Terrie Roa MD [facility]   [address] [place] [address] E [facility]            Patient's Medications   Discharge Prescriptions    AYR SALINE NASAL NO-DRIP GEL    2 sprays each nostril as needed       Start Date: 3/22/2022 End Date: --       Order Dose: --       Quantity: 22 mL    Refills: 0    SODIUM CHLORIDE (OCEAN) 0 65 % NASAL SPRAY    1 spray into each nostril as needed for congestion or rhinitis       Start Date: 3/22/2022 End Date: --       Order Dose: 1 spray       Quantity: 15 mL    Refills: 0       No discharge procedures on file      PDMP Review     None          ED Provider  Electronically Signed by           Abby Phan PA-C  03/22/22 3306

## 2022-03-23 NOTE — DISCHARGE INSTRUCTIONS
Afrin - 2 sprays to each nostril 1x today, 2 x tomorrow and 1 x following day  Saline nasal spray (generic ocean) or saline gel spray (ayr or meena med)- 2 sprays each nostril 7-10 x a day  If the nose starts to bleed - use afrin again and pinch and hold anteriorly for at least 5 min  FU with your doctor  Tylenol or Motrin for fevers/pain  Saline spray for congestion you may use Mucinex for cough and congestion increase, fluids follow-up with the family doctor  Return to the emergency department for worsening symptoms

## 2022-05-23 ENCOUNTER — OFFICE VISIT (OUTPATIENT)
Dept: OBGYN CLINIC | Facility: CLINIC | Age: 32
End: 2022-05-23
Payer: COMMERCIAL

## 2022-05-23 VITALS
BODY MASS INDEX: 32.1 KG/M2 | DIASTOLIC BLOOD PRESSURE: 58 MMHG | WEIGHT: 170 LBS | HEIGHT: 61 IN | SYSTOLIC BLOOD PRESSURE: 100 MMHG | HEART RATE: 76 BPM

## 2022-05-23 DIAGNOSIS — N91.2 AMENORRHEA: Primary | ICD-10-CM

## 2022-05-23 LAB — SL AMB POCT URINE HCG: NEGATIVE

## 2022-05-23 PROCEDURE — 99213 OFFICE O/P EST LOW 20 MIN: CPT | Performed by: OBSTETRICS & GYNECOLOGY

## 2022-05-23 PROCEDURE — 81025 URINE PREGNANCY TEST: CPT | Performed by: OBSTETRICS & GYNECOLOGY

## 2022-05-23 NOTE — ASSESSMENT & PLAN NOTE
Menses about 5 days late  UPT here negative  Exam benign, mild tenderness only  Discussed if no menses in next week can check blood HCG   Precautions reviewed

## 2022-05-23 NOTE — PROGRESS NOTES
Subjective   Patient ID: Tyrone Pearson is a 33 y o  female  Patient is here for a problem visit  Chief Complaint   Patient presents with    Possible Pregnancy     LMP- 22, cramping and rust color discharge     Previously on depo provera, last given 10/11/21   Last 2 months regular periods, lasting 1 week   Trying to conceive   Friday was moving furniture - afterwards started having pelvic cramping  Worried due to her history of  birth   Has not taken a home UPT   Some dark brown spotting starting today   No new sexual partners     Menstrual History:  OB History        2    Para   2    Term   0       2    AB   0    Living   2       SAB   0    IAB   0    Ectopic   0    Multiple   0    Live Births   2                Patient's last menstrual period was 2022  Past Medical History:   Diagnosis Date    Abnormal Pap smear of cervix     Asthma     NO MEDS FOR PAST SEVERAL MONTHS    Disease of thyroid gland     High-risk pregnancy in second trimester     LA    16   R   16     HPV (human papilloma virus) infection     Migraine        Past Surgical History:   Procedure Laterality Date    ABDOMINOPLASTY      CERVICAL BIOPSY  W/ LOOP ELECTRODE EXCISION      LIPOSUCTION      LIPOSUCTION  2021       Social History     Tobacco Use    Smoking status: Never Smoker    Smokeless tobacco: Never Used   Vaping Use    Vaping Use: Never used   Substance Use Topics    Alcohol use: Not Currently     Comment: social alcohol use per allscript     Drug use: No        No Known Allergies      Current Outpatient Medications:     Ayr Saline Nasal No-Drip GEL, 2 sprays each nostril as needed, Disp: 22 mL, Rfl: 0    Calcium-Magnesium-Vitamin D (CALCIUM 1200+D3 PO), Take by mouth, Disp: , Rfl:     D3-50 1 25 MG (36341 UT) capsule, , Disp: , Rfl:     Ferrous Sulfate (Iron) 325 (65 Fe) MG TABS, Take by mouth, Disp: , Rfl:     multivitamin (THERAGRAN) TABS, Take 1 tablet by mouth daily, Disp: , Rfl:     Potassium Gluconate 595 (99 K) MG TABS, Take by mouth (Patient not taking: Reported on 5/23/2022), Disp: , Rfl:     sodium chloride (OCEAN) 0 65 % nasal spray, 1 spray into each nostril as needed for congestion or rhinitis (Patient not taking: Reported on 5/23/2022), Disp: 15 mL, Rfl: 0      Review of Systems   Constitutional: Negative for chills and fever  Eyes: Negative for visual disturbance  Respiratory: Negative for chest tightness and shortness of breath  Cardiovascular: Negative for chest pain  Gastrointestinal: Negative for abdominal pain, diarrhea, nausea and vomiting  Genitourinary: Positive for pelvic pain  Negative for vaginal bleeding  As noted in HPI   Skin: Negative for rash  Neurological: Negative for headaches  All other systems reviewed and are negative  /58 (BP Location: Left arm, Patient Position: Sitting, Cuff Size: Standard)   Pulse 76   Ht 5' 1" (1 549 m)   Wt 77 1 kg (170 lb)   LMP 04/18/2022   BMI 32 12 kg/m²       Physical Exam  Constitutional:       General: She is not in acute distress  Appearance: Normal appearance  She is not ill-appearing  Genitourinary:      Bladder and urethral meatus normal       Right Labia: No rash, tenderness, lesions or skin changes  Left Labia: No tenderness, lesions, skin changes or rash  No labial fusion noted  No inguinal adenopathy present in the right or left side  Vaginal bleeding present  No vaginal discharge, erythema, tenderness or ulceration  Vaginal exam comments: Scant dark brown blood in vault   Right Adnexa: not tender, not full and no mass present  Left Adnexa: not tender, not full and no mass present  Cervix is parous  No cervical motion tenderness, discharge, friability, lesion, polyp or eversion  Uterus is tender  Uterus is not enlarged, fixed or irregular  No uterine mass detected       Uterus exam comments: Mild midline TTP   Uterus is anteverted  Pelvic exam was performed with patient in the lithotomy position  HENT:      Head: Normocephalic and atraumatic  Cardiovascular:      Rate and Rhythm: Normal rate  Heart sounds: Normal heart sounds  Pulmonary:      Effort: Pulmonary effort is normal  No accessory muscle usage or respiratory distress  Abdominal:      General: There is no distension  Palpations: Abdomen is soft  There is no mass  Tenderness: There is no abdominal tenderness  There is no guarding or rebound  Musculoskeletal:         General: Normal range of motion  Cervical back: No rigidity  Lymphadenopathy:      Lower Body: No right inguinal adenopathy  No left inguinal adenopathy  Neurological:      General: No focal deficit present  Mental Status: She is alert  Mental status is at baseline  Skin:     General: Skin is warm and dry  Psychiatric:         Mood and Affect: Mood normal          Behavior: Behavior normal    Vitals and nursing note reviewed  Exam conducted with a chaperone present  Results for orders placed or performed in visit on 05/23/22   POCT urine HCG   Result Value Ref Range    URINE HCG Negative        Appropriate laboratory testing, imaging studies, and prior external records were reviewed:     Assessment/Plan:       Problem List Items Addressed This Visit        Other    Amenorrhea - Primary     Menses about 5 days late  UPT here negative  Exam benign, mild tenderness only  Discussed if no menses in next week can check blood HCG   Precautions reviewed            Relevant Orders    POCT urine HCG (Completed)    hCG, quantitative

## 2022-06-29 ENCOUNTER — ANNUAL EXAM (OUTPATIENT)
Dept: OBGYN CLINIC | Facility: CLINIC | Age: 32
End: 2022-06-29
Payer: COMMERCIAL

## 2022-06-29 VITALS
TEMPERATURE: 98.6 F | HEIGHT: 61 IN | WEIGHT: 169.4 LBS | SYSTOLIC BLOOD PRESSURE: 88 MMHG | BODY MASS INDEX: 31.98 KG/M2 | HEART RATE: 85 BPM | DIASTOLIC BLOOD PRESSURE: 60 MMHG

## 2022-06-29 DIAGNOSIS — Z86.32 HISTORY OF GESTATIONAL DIABETES: ICD-10-CM

## 2022-06-29 DIAGNOSIS — E03.9 HYPOTHYROIDISM AFFECTING PREGNANCY IN FIRST TRIMESTER: ICD-10-CM

## 2022-06-29 DIAGNOSIS — Z36.87 UNSURE OF LMP (LAST MENSTRUAL PERIOD) AS REASON FOR ULTRASOUND SCAN: ICD-10-CM

## 2022-06-29 DIAGNOSIS — O99.281 HYPOTHYROIDISM AFFECTING PREGNANCY IN FIRST TRIMESTER: ICD-10-CM

## 2022-06-29 DIAGNOSIS — N91.2 AMENORRHEA: Primary | ICD-10-CM

## 2022-06-29 DIAGNOSIS — Z34.90 EARLY STAGE OF PREGNANCY: ICD-10-CM

## 2022-06-29 DIAGNOSIS — O09.291: ICD-10-CM

## 2022-06-29 LAB — SL AMB POCT URINE HCG: POSITIVE

## 2022-06-29 PROCEDURE — 76817 TRANSVAGINAL US OBSTETRIC: CPT | Performed by: OBSTETRICS & GYNECOLOGY

## 2022-06-29 PROCEDURE — 81025 URINE PREGNANCY TEST: CPT | Performed by: OBSTETRICS & GYNECOLOGY

## 2022-06-29 PROCEDURE — 99214 OFFICE O/P EST MOD 30 MIN: CPT | Performed by: OBSTETRICS & GYNECOLOGY

## 2022-06-29 RX ORDER — BUPROPION HYDROCHLORIDE 150 MG/1
TABLET ORAL
COMMUNITY
Start: 2022-04-06

## 2022-06-29 RX ORDER — .ALPHA.-TOCOPHEROL ACETATE, DL-, ASCORBIC ACID, CHOLECALCIFEROL, CYANOCOBALAMIN, FOLIC ACID, FERROUS FUMARATE, CALCIUM PHOSPHATE, DIBASIC, ANHYDROUS, NIACINAMIDE, PYRIDOXINE HYDROCHLORIDE, RIBOFLAVIN, THIAMINE MONONITRATE, AND VITAMIN A ACETATE 15; 60; 400; 4.5; 1; 27; 50; 13.5; 1.05; 1.2; 1.05; 25 [IU]/1; MG/1; [IU]/1; UG/1; MG/1; MG/1; MG/1; MG/1; MG/1; MG/1; MG/1; [IU]/1
1 TABLET ORAL DAILY
Qty: 30 TABLET | Refills: 8 | Status: SHIPPED | OUTPATIENT
Start: 2022-06-29

## 2022-06-29 RX ORDER — LEVOTHYROXINE SODIUM 0.1 MG/1
TABLET ORAL
COMMUNITY
Start: 2022-06-11

## 2022-06-29 NOTE — PROGRESS NOTES
Assessment/Plan     Diagnoses and all orders for this visit:    Amenorrhea  -     POCT urine HCG    Early stage of pregnancy  -     Prenatal Vit-Fe Fumarate-FA (PNV Folic Acid + Iron) 10-0 MG TABS; Take 1 tablet by mouth daily  -     hCG, quantitative; Future  -     TSH, 3rd generation with Free T4 reflex; Future  -     Progesterone; Future    Hypothyroidism affecting pregnancy in first trimester    H/O maternal fourth degree perineal laceration, currently pregnant, first trimester    History of gestational diabetes    Unsure of LMP (last menstrual period) as reason for ultrasound scan    Other orders  -     buPROPion (WELLBUTRIN XL) 150 mg 24 hr tablet  -     levothyroxine 100 mcg tablet        Patient with early pregnancy stage  Discussed with patient differential diagnosis including early intrauterine pregnancy versus nonviable intrauterine pregnancy versus ectopic pregnancy  Patient was advised to call if with severe pelvic pain or heavy vaginal bleeding  In the meantime, I advised HCG quantitative, progesterone and TSH today  I advised most likely need to trend hCG quant and repeat ultrasound in 2 weeks for dating and viability  In the meantime, and instructed for her to take prenatal vitamins  I advised for patient to call if she has any pregnancy issues or concerns  Otherwise we will call her with her HCG test results and see her in 2 weeks for repeat ultrasound for dating and viability    Subjective   Berenda Aase is an 33 y o  woman who presents for pregnancy confirmation  Chief Complaint   Patient presents with    Amenorrhea     LMP 5/25/22  Patient with breast tenderness and spotting  Patient is here for a pregnancy confirmation  Patient's last menstrual period was 05/25/2022 (exact date)  5 0/7 weeks  TYSON 3/1/23      She has cramping  She has spotting on Monday  She has breast tenderness  She denies vaginal bleeding or pelvic pain  She has no nausea or vomiting    She is taking prenatal vitamins  Her first positive pregnancy test was  today  She missed a period in April  OB History    Para Term  AB Living   3 2 0 2 0 2   SAB IAB Ectopic Multiple Live Births   0 0 0 0 2      # Outcome Date GA Lbr Jj/2nd Weight Sex Delivery Anes PTL Lv   3 Current            2  16 36w6d / 00:14 2890 g (6 lb 5 9 oz) M Vag-Spont EPI Y DALI      Name: Mike Hill  (GERI)      Apgar1: 9  Apgar5: 9   1  09 36w0d  4252 g (9 lb 6 oz) M Vag-Spont  Y DALI      Birth Comments: 4th degree laceration       Past Medical History:   Diagnosis Date    Abnormal Pap smear of cervix     Asthma     NO MEDS FOR PAST SEVERAL MONTHS    Disease of thyroid gland     High-risk pregnancy in second trimester     LA    16   R   16     HPV (human papilloma virus) infection     Migraine        Past Surgical History:   Procedure Laterality Date    ABDOMINOPLASTY      CERVICAL BIOPSY  W/ LOOP ELECTRODE EXCISION      LIPOSUCTION      LIPOSUCTION  2021       Social History     Tobacco Use    Smoking status: Never Smoker    Smokeless tobacco: Never Used   Vaping Use    Vaping Use: Never used   Substance Use Topics    Alcohol use: Not Currently     Comment: social alcohol use per allscript     Drug use: No       No Known Allergies      Current Outpatient Medications:     Ayr Saline Nasal No-Drip GEL, 2 sprays each nostril as needed, Disp: 22 mL, Rfl: 0    buPROPion (WELLBUTRIN XL) 150 mg 24 hr tablet, , Disp: , Rfl:     Calcium-Magnesium-Vitamin D (CALCIUM 1200+D3 PO), Take by mouth, Disp: , Rfl:     D3-50 1 25 MG (57063 UT) capsule, , Disp: , Rfl:     Ferrous Sulfate (Iron) 325 (65 Fe) MG TABS, Take by mouth, Disp: , Rfl:     levothyroxine 100 mcg tablet, , Disp: , Rfl:     multivitamin (THERAGRAN) TABS, Take 1 tablet by mouth daily, Disp: , Rfl:     Potassium Gluconate 595 (99 K) MG TABS, Take by mouth, Disp: , Rfl:     Prenatal Vit-Fe Fumarate-FA (PNV Folic Acid + Iron) 27-1 MG TABS, Take 1 tablet by mouth daily, Disp: 30 tablet, Rfl: 8    sodium chloride (OCEAN) 0 65 % nasal spray, 1 spray into each nostril as needed for congestion or rhinitis, Disp: 15 mL, Rfl: 0    The following portions of the patient's history were reviewed and updated as appropriate: allergies, current medications, past family history, past medical history, past social history, past surgical history and problem list       Review of Systems   Constitutional: Negative  HENT: Negative  Eyes: Negative  Respiratory: Negative  Cardiovascular: Negative  Gastrointestinal: Negative  Endocrine: Negative  Genitourinary:        As noted in HPI   Musculoskeletal: Negative  Skin: Negative  Allergic/Immunologic: Negative  Neurological: Negative  Hematological: Negative  Psychiatric/Behavioral: Negative  BP (!) 88/60 (BP Location: Right arm, Patient Position: Sitting, Cuff Size: Adult)   Pulse 85   Temp 98 6 °F (37 °C) (Tympanic)   Ht 5' 1" (1 549 m)   Wt 76 8 kg (169 lb 6 4 oz)   LMP 05/25/2022 (Exact Date)   BMI 32 01 kg/m²     Physical Exam  Constitutional:       General: She is not in acute distress  Appearance: She is well-developed  Abdominal:      Palpations: Abdomen is soft  Tenderness: There is no abdominal tenderness  There is no guarding  Neurological:      Mental Status: She is alert and oriented to person, place, and time  Skin:     General: Skin is warm and dry  Psychiatric:         Behavior: Behavior normal            FIRST TRIMESTER OBSTETRIC ULTRASOUND  06/29/22    Giovany Dominguez MD       INDICATION: Amenorrhea, viability    COMPARISON: None  TECHNIQUE:   Transvaginal imaging was performed to assess the gestation, myometrial/endometrial architecture and ovarian parenchymal detail  The study includes volumetric sweeps and traditional still imaging technique        FINDINGS:     Small gestational sac seen without yolk sac or fetal pole  Gestational sac measuring  0 59 x 1 15 x 0 87 cm  MSD 0 87 cm      UTERUS/ADNEXA:   No adnexal mass or pathologic cyst   No free fluid identified  IMPRESSION:     Pregnancy of uncertain viability        Ultrasound Probe Disinfection    A transvaginal ultrasound was performed  Prior to use, disinfection was performed with High Level Disinfection Process ([employer])  Probe serial number F: [id number] was used  Jagdish Billy MD  06/29/22  3:35 PM      Counseling / Coordination of Care  Total time spent today30 minutes  minutes  Greater than 50% of total time was spent with the patient and / or family counseling and / or coordination of care

## 2022-06-29 NOTE — PATIENT INSTRUCTIONS
Pregnancy   WHAT YOU NEED TO KNOW:   A normal pregnancy lasts about 40 weeks  The first trimester lasts from your last period through the 12th week of pregnancy  The second trimester lasts from the 13th week through the 23rd week  The third trimester lasts from the 24th week until your baby is born  If you know the date of your last period, your healthcare provider can estimate your due date  You may give birth to your baby any time from 37 weeks to 2 weeks after your due date  DISCHARGE INSTRUCTIONS:   Return to the emergency department if:   You develop a severe headache that does not go away  You have new or increased vision changes, such as blurred or spotted vision  You have new or increased swelling in your face or hands  You have pain or cramping in your abdomen or low back  You have vaginal bleeding  Call your doctor or obstetrician if:   You have abdominal cramps, pressure, or tightening  You have a change in vaginal discharge  You cannot keep food or drinks down, and you are losing weight  You have chills or a fever  You have vaginal itching, burning, or pain  You have yellow, green, white, or foul-smelling vaginal discharge  You have pain or burning when you urinate, less urine than usual, or pink or bloody urine  You have questions or concerns about your condition or care  Medicines:   Prenatal vitamins  provide some of the extra vitamins and minerals you need during pregnancy  Prenatal vitamins may also help to decrease the risk for certain birth defects  Take your medicine as directed  Contact your healthcare provider if you think your medicine is not helping or if you have side effects  Tell him or her if you are allergic to any medicine  Keep a list of the medicines, vitamins, and herbs you take  Include the amounts, and when and why you take them  Bring the list or the pill bottles to follow-up visits   Carry your medicine list with you in case of an emergency  Prenatal care:  Prenatal care is a series of visits with your healthcare provider throughout your pregnancy  Prenatal care can help prevent problems during pregnancy and childbirth  At each prenatal visit, your provider will weigh you and check your blood pressure  He or she will also check your baby's heartbeat and growth  You may also need the following at some visits:  A pelvic exam  allows your healthcare provider to see your cervix (the bottom part of your uterus)  Your healthcare provider will use a speculum to open your vagina  He or she will check the size and shape of your uterus  At your first prenatal visit, you may also have a Pap smear  This is a test to check your cervix for abnormal cells  Blood tests  may be done to check for any of the following:     Gestational diabetes or anemia (low iron level)    Blood type or Rh factor, or certain birth defects    Immunity to certain diseases, such as chickenpox or rubella    An infection, such as a sexually transmitted infection, HIV, or hepatitis B    Hepatitis B  may need to be prevented or treated  Hepatitis B is inflammation of the liver caused by the hepatitis B virus (HBV)  HBV can spread from a mother to her baby during delivery  You will be checked for HBV as early as possible in the first trimester of each pregnancy  You need the test even if you received the hepatitis B vaccine or were tested before  You may need to have an HBV infection treated before you give birth  Urine tests  may also be done to check for sugar and protein  These can be signs of gestational diabetes or preeclampsia  Urine tests may also be done to check for signs of infection  A gestational diabetes screen  may be done  Your healthcare provider may order either a 1-step or 2-step oral glucose tolerance test (OGTT)  1-step OGTT:  Your blood sugar level will be tested after you have not eaten for 8 hours (fasting)  You will then be given a glucose drink  Your level will be tested again 1 hour and 2 hours after you finish the drink  2-step OGTT:  You do not have to fast for the first part of the test  You will have the glucose drink at any time of day  Your blood sugar level will be checked 1 hour later  If your blood sugar is higher than a certain level, another test will be ordered  You will fast and your blood sugar level will be tested  You will have the glucose drink  Your blood will be tested again 1 hour, 2 hours, and 3 hours after you finish the glucose drink  A fetal ultrasound  shows pictures of your baby inside your uterus  The pictures are used to check your baby's development, movement, and position  Genetic disorder screening tests  may be offered to you  These tests check your baby's risk for genetic disorders such as Down syndrome  A screening test may include blood tests and an ultrasound  Blood tests may be used to check your DNA or your partner's DNA  Genetic tests are not always accurate or complete  Your baby may be born with a genetic disorder that did not show up in the tests  Talk to your healthcare provider about any concerns you have with genetic testing  Body changes that may occur during your pregnancy:   Breast changes  you will experience include tenderness and tingling during the early part of your pregnancy  Your breasts will become larger  You may need to use a support bra  You may see a thin, yellow fluid, called colostrum, leak from your nipples during the second trimester  Colostrum is a liquid that changes to milk about 3 days after you give birth  Skin changes and stretch marks  may occur during your pregnancy  You may have red marks, called stretch marks, on your skin  Stretch marks will usually fade after pregnancy  Use lotion if your skin is dry and itchy  The skin on your face, around your nipples, and below your belly button may darken   Most of the time, your skin will return to its normal color after your baby is born  Morning sickness  is nausea and vomiting that can happen at any time of day  Avoid fatty and spicy foods  Eat small meals throughout the day instead of large meals  Jie may help to decrease nausea  Ask your healthcare provider about other ways of decreasing nausea and vomiting  Heartburn  may be caused by changes in your hormones during pregnancy  Your growing uterus may also push your stomach upward and force stomach acid to back up into your esophagus  Eat 4 or 5 small meals each day instead of large meals  Avoid spicy foods  Avoid eating right before bedtime  Constipation  may develop during your pregnancy  To treat constipation, eat foods high in fiber such as fiber cereals, beans, fruits, vegetables, whole-grain breads, and prune juice  Get regular exercise and drink plenty of water  Your healthcare provider may also suggest a fiber supplement to soften your bowel movements  Talk to your healthcare provider before you use any medicines to decrease constipation  Hemorrhoids  are enlarged veins in the rectal area  They may cause pain, itching, and bright red bleeding from your rectum  To decrease your risk for hemorrhoids, prevent constipation and do not strain to have a bowel movement  If you have hemorrhoids, soak in a tub of warm water to ease discomfort  Ask your healthcare provider how you can treat hemorrhoids  Leg cramps and swelling  may be caused by low calcium levels or the added weight of pregnancy  Raise your legs above the level of your heart to decrease swelling  During a leg cramp, stretch or massage the muscle that has the cramp  Heat may help decrease pain and muscle spasms  Apply heat on your muscle for 20 to 30 minutes every 2 hours for as many days as directed  Back pain  may occur as your baby grows  Do not stand for long periods of time or lift heavy items  Use good posture while you stand, squat, or bend   Wear low-heeled shoes with good support  Rest may also help to relieve back pain  Ask your healthcare provider about exercises you can do to strengthen your back muscles  Stay healthy during your pregnancy:       Eat a variety of healthy foods  Healthy foods include fruits, vegetables, whole-grain breads, low-fat dairy foods, beans, lean meats, and fish  Drink liquids as directed  Ask how much liquid to drink each day and which liquids are best for you  Limit caffeine to less than 200 milligrams each day  Limit your intake of fish to 2 servings each week  Choose fish low in mercury such as canned light tuna, shrimp, crab, salmon, cod, or tilapia  Do not  eat fish high in mercury such as swordfish, tilefish, hermila mackerel, and shark  Take prenatal vitamins as directed  Your need for certain vitamins and minerals, such as folic acid, increases during pregnancy  Prenatal vitamins provide some of the extra vitamins and minerals you need  Prenatal vitamins may also help to decrease the risk for certain birth defects  Ask how much weight you should gain during your pregnancy  Too much or too little weight gain can be unhealthy for you and your baby  Talk to your healthcare provider about exercise  Moderate exercise can help you stay fit  Your healthcare provider will help you plan an exercise program that is safe for you during pregnancy  Do not smoke  Smoking increases your risk for a miscarriage and heart and blood vessel problems  Smoking can cause your baby to be born too early or weigh less at birth  Quit smoking as soon as you think you might be pregnant  Ask your healthcare provider for information if you need help quitting  Do not drink alcohol  Alcohol passes from your body to your baby through the placenta  It can affect your baby's brain development and cause fetal alcohol syndrome (FAS)  FAS is a group of conditions that causes mental, behavior, and growth problems      Talk to your healthcare provider before you take any medicines  Many medicines may harm your baby if you take them when you are pregnant  Do not take any medicines, vitamins, herbs, or supplements without first talking to your healthcare provider  Never use illegal or street drugs (such as marijuana or cocaine) while you are pregnant  Safety tips:   Avoid hot tubs and saunas  Do not use a hot tub or sauna while you are pregnant, especially during your first trimester  Hot tubs and saunas may raise your baby's temperature and increase the risk for birth defects  Avoid toxoplasmosis  This is an infection caused by eating raw meat or being around infected cat feces  It can cause birth defects, miscarriages, and other problems  Wash your hands after you touch raw meat  Make sure any meat is well-cooked before you eat it  Avoid raw eggs and unpasteurized milk  Use gloves or ask someone else to clean your cat's litter box while you are pregnant  Ask your healthcare provider about travel  The most comfortable time to travel is during the second trimester  Ask your healthcare provider if you can travel after 36 weeks  You may not be able to travel in an airplane after 36 weeks  He or she may also recommend that you avoid long road trips  Follow up with your doctor or obstetrician as directed:  Go to all of your prenatal visits during your pregnancy  Write down your questions so you remember to ask them during your visits  © Copyright [employer] 2022 Information is for End User's use only and may not be sold, redistributed or otherwise used for commercial purposes  All illustrations and images included in CareNotes® are the copyrighted property of [employer]  or [facility]et Morris   The above information is an  only  It is not intended as medical advice for individual conditions or treatments  Talk to your doctor, nurse or pharmacist before following any medical regimen to see if it is safe and effective for you

## 2022-06-30 ENCOUNTER — TELEPHONE (OUTPATIENT)
Dept: OBGYN CLINIC | Facility: CLINIC | Age: 32
End: 2022-06-30

## 2022-06-30 DIAGNOSIS — E03.9 HYPOTHYROIDISM AFFECTING PREGNANCY IN FIRST TRIMESTER: Primary | ICD-10-CM

## 2022-06-30 DIAGNOSIS — Z34.90 EARLY STAGE OF PREGNANCY: ICD-10-CM

## 2022-06-30 DIAGNOSIS — O99.281 HYPOTHYROIDISM AFFECTING PREGNANCY IN FIRST TRIMESTER: Primary | ICD-10-CM

## 2022-06-30 NOTE — TELEPHONE ENCOUNTER
HCG 11  698 [id number]  TSH 13 803    Off synthroid x 2 weeks  Advised to start taking at 100 mcg synthroid as previously on and recheck TSH in 2 weeks, discussed possible need for dose adjustment    Advised repeat HCG tomorrow to check doubling

## 2022-07-11 PROBLEM — R51.9 PREGNANCY HEADACHE IN FIRST TRIMESTER: Status: ACTIVE | Noted: 2022-07-11

## 2022-07-25 PROBLEM — Z3A.08 8 WEEKS GESTATION OF PREGNANCY: Status: ACTIVE | Noted: 2022-07-25
